# Patient Record
Sex: FEMALE | Race: WHITE | NOT HISPANIC OR LATINO | Employment: FULL TIME | URBAN - METROPOLITAN AREA
[De-identification: names, ages, dates, MRNs, and addresses within clinical notes are randomized per-mention and may not be internally consistent; named-entity substitution may affect disease eponyms.]

---

## 2020-07-27 ENCOUNTER — TELEPHONE (OUTPATIENT)
Dept: OBSTETRICS AND GYNECOLOGY | Facility: CLINIC | Age: 50
End: 2020-07-27

## 2020-07-27 NOTE — TELEPHONE ENCOUNTER
----- Message from Rob Greene sent at 7/24/2020  2:49 PM CDT -----  Regarding: Referral to endocrinology  Type:  Patient Requesting Referral    Who Called:Renetta  Does the patient already have the specialty appointment scheduled?:No  If yes, what is the date of that appointment?:na  Referral to What Specialty:Endocrinology  Reason for Referral:PCOS  Does the patient want the referral with a specific physician?:Dr. Aquino  Is the specialist an [facility]  Patient Requesting a Response?:Yes  Would the patient rather a call back or a response via [facility]? Call back  Best Call Back Number:[phone] (home)  Additional Information: eliza

## 2020-07-28 ENCOUNTER — PATIENT MESSAGE (OUTPATIENT)
Dept: OBSTETRICS AND GYNECOLOGY | Facility: CLINIC | Age: 50
End: 2020-07-28

## 2020-07-30 ENCOUNTER — OFFICE VISIT (OUTPATIENT)
Dept: OBSTETRICS AND GYNECOLOGY | Facility: CLINIC | Age: 50
End: 2020-07-30
Payer: COMMERCIAL

## 2020-07-30 VITALS — WEIGHT: 239 LBS | DIASTOLIC BLOOD PRESSURE: 80 MMHG | SYSTOLIC BLOOD PRESSURE: 118 MMHG

## 2020-07-30 DIAGNOSIS — E28.2 PCOS (POLYCYSTIC OVARIAN SYNDROME): ICD-10-CM

## 2020-07-30 DIAGNOSIS — E88.819 INSULIN RESISTANCE: Primary | ICD-10-CM

## 2020-07-30 PROCEDURE — 99213 OFFICE O/P EST LOW 20 MIN: CPT | Mod: S$GLB,,, | Performed by: OBSTETRICS & GYNECOLOGY

## 2020-07-30 PROCEDURE — 99213 PR OFFICE/OUTPT VISIT, EST, LEVL III, 20-29 MIN: ICD-10-PCS | Mod: S$GLB,,, | Performed by: OBSTETRICS & GYNECOLOGY

## 2020-07-30 RX ORDER — HYDROCHLOROTHIAZIDE 12.5 MG/1
12.5 CAPSULE ORAL DAILY
COMMUNITY
Start: 2020-07-07 | End: 2020-11-23 | Stop reason: SDUPTHER

## 2020-07-30 RX ORDER — METFORMIN HYDROCHLORIDE 850 MG/1
850 TABLET ORAL 2 TIMES DAILY WITH MEALS
COMMUNITY
Start: 2020-07-07 | End: 2020-11-23

## 2020-07-30 RX ORDER — NABUMETONE 500 MG/1
500 TABLET, FILM COATED ORAL DAILY
COMMUNITY
Start: 2020-07-07 | End: 2020-11-23

## 2020-07-30 RX ORDER — SIMVASTATIN 40 MG/1
40 TABLET, FILM COATED ORAL
COMMUNITY
Start: 2020-07-07 | End: 2020-11-23 | Stop reason: SDUPTHER

## 2020-07-30 RX ORDER — ENALAPRIL MALEATE 10 MG/1
10 TABLET ORAL 2 TIMES DAILY
COMMUNITY
Start: 2020-07-07 | End: 2020-11-23 | Stop reason: SDUPTHER

## 2020-07-30 RX ORDER — VALACYCLOVIR HYDROCHLORIDE 500 MG/1
TABLET, FILM COATED ORAL
COMMUNITY
Start: 2020-05-22 | End: 2023-07-05

## 2020-07-30 NOTE — PROGRESS NOTES
Subjective:       Patient ID: Renetta Sotelo is a 49 y.o. female.    Chief Complaint:  Consult (pcos)      History of Present Illness  HPI  Patient presents today with complaints of pain to transfer her care to me for polycystic ovaries and insulin resistance that was previously with Dr. Kristian vanessa who has passed away    GYN & OB History  No LMP recorded.   Date of Last Pap: No result found    OB History    Para Term  AB Living   1 1 1     1   SAB TAB Ectopic Multiple Live Births                  # Outcome Date GA Lbr Willy/2nd Weight Sex Delivery Anes PTL Lv   1 Term      Vag-Spont          Review of Systems  Review of Systems   Constitutional: Negative.  Negative for activity change, appetite change, chills, fatigue, fever and unexpected weight change.   HENT: Negative for nasal congestion.    Eyes: Negative for visual disturbance.   Respiratory: Negative for cough, shortness of breath and wheezing.    Cardiovascular: Negative for chest pain, palpitations and leg swelling.   Gastrointestinal: Negative.  Negative for abdominal pain, bloating, blood in stool, constipation, diarrhea and reflux.   Endocrine: Negative.  Negative for diabetes, hair loss, hot flashes, hyperthyroidism and hypothyroidism.   Genitourinary: Negative.  Negative for bladder incontinence, decreased libido, dysmenorrhea, dyspareunia, dysuria, flank pain, frequency, genital sores, hematuria, hot flashes, menorrhagia, menstrual problem, pelvic pain, urgency, vaginal bleeding, vaginal discharge, vaginal pain, urinary incontinence, postcoital bleeding, postmenopausal bleeding, vaginal dryness and vaginal odor.   Musculoskeletal: Negative for back pain, joint swelling and myalgias.   Integumentary:  Negative for rash, acne, hair changes, mole/lesion, breast mass, nipple discharge, breast skin changes and breast tenderness. Negative.   Neurological: Negative.  Negative for vertigo, seizures, syncope, numbness and headaches.    Hematological: Negative.  Negative for adenopathy. Does not bruise/bleed easily.   Psychiatric/Behavioral: Negative.  Negative for depression and sleep disturbance. The patient is not nervous/anxious.    Breast: negative.  Negative for asymmetry, breast self exam, lump, mass, mastodynia, nipple discharge, skin changes and tenderness          Objective:    Physical Exam:    HENT:   Nose: No epistaxis.                                   Assessment:        1. Insulin resistance    2. PCOS (polycystic ovarian syndrome)       Insulin resistance    PCOS (polycystic ovarian syndrome)             Plan:      Follow up for already scheduled appointment.     she currently has enough metformin I will refill that when it is necessary  Pt was counseled on chronic anovulation/PCOS, including common signs/symptoms and the many factors that influence it.  Pt was also counseled on available treatment options, including the associated risks and benefits of each.  Pt voiced understanding and desires to proceed with metformin Pt was counseled on the link between obesity and PCOS.  Recommend a healthy diet and exercise regimen with goal 20% weight loss.

## 2020-07-31 ENCOUNTER — TELEPHONE (OUTPATIENT)
Dept: OBSTETRICS AND GYNECOLOGY | Facility: CLINIC | Age: 50
End: 2020-07-31

## 2020-07-31 DIAGNOSIS — E66.9 OBESITY, UNSPECIFIED CLASSIFICATION, UNSPECIFIED OBESITY TYPE, UNSPECIFIED WHETHER SERIOUS COMORBIDITY PRESENT: Primary | ICD-10-CM

## 2020-07-31 RX ORDER — PHENTERMINE HYDROCHLORIDE 37.5 MG/1
37.5 CAPSULE ORAL EVERY MORNING
Qty: 30 CAPSULE | Refills: 0 | Status: SHIPPED | OUTPATIENT
Start: 2020-07-31 | End: 2020-08-30

## 2020-07-31 NOTE — TELEPHONE ENCOUNTER
Renetta came in yesterday for a consult and forgot to ask if she could get phentermine filled. Her blood pressure was 117 / 80

## 2020-09-05 ENCOUNTER — OFFICE VISIT (OUTPATIENT)
Dept: URGENT CARE | Facility: CLINIC | Age: 50
End: 2020-09-05
Payer: COMMERCIAL

## 2020-09-05 VITALS — OXYGEN SATURATION: 98 % | TEMPERATURE: 98 F | HEART RATE: 118 BPM

## 2020-09-05 DIAGNOSIS — S93.402A SPRAIN OF LEFT ANKLE, UNSPECIFIED LIGAMENT, INITIAL ENCOUNTER: Primary | ICD-10-CM

## 2020-09-05 DIAGNOSIS — T14.90XA TRAUMA: ICD-10-CM

## 2020-09-05 PROBLEM — E78.00 PURE HYPERCHOLESTEROLEMIA: Status: ACTIVE | Noted: 2020-09-05

## 2020-09-05 PROBLEM — E11.9 TYPE 2 DIABETES MELLITUS, WITHOUT LONG-TERM CURRENT USE OF INSULIN: Status: ACTIVE | Noted: 2020-09-05

## 2020-09-05 PROBLEM — I10 ESSENTIAL HYPERTENSION: Status: ACTIVE | Noted: 2020-09-05

## 2020-09-05 PROCEDURE — 99214 OFFICE O/P EST MOD 30 MIN: CPT | Mod: S$GLB,,, | Performed by: FAMILY MEDICINE

## 2020-09-05 PROCEDURE — 99214 PR OFFICE/OUTPT VISIT, EST, LEVL IV, 30-39 MIN: ICD-10-PCS | Mod: S$GLB,,, | Performed by: FAMILY MEDICINE

## 2020-09-05 PROCEDURE — 73610 XR ANKLE COMPLETE 3 VIEW LEFT: ICD-10-PCS | Mod: FY,LT,S$GLB, | Performed by: RADIOLOGY

## 2020-09-05 PROCEDURE — 73610 X-RAY EXAM OF ANKLE: CPT | Mod: FY,LT,S$GLB, | Performed by: RADIOLOGY

## 2020-09-05 NOTE — PATIENT INSTRUCTIONS

## 2020-09-05 NOTE — PROGRESS NOTES
Subjective:       Patient ID: Renetta Sotelo is a 50 y.o. female.    Vitals:  temperature is 97.7 °F (36.5 °C). Her pulse is 118 (abnormal). Her oxygen saturation is 98%.     Chief Complaint: Ankle Injury    Patient states that she rolled her left ankle about thirty minutes ago.  Patient says that she can walk on her ankle but states that it is painful to do so.  Pt also says that her ankle hurts with any movement.    Ankle Injury   The incident occurred less than 1 hour ago. The incident occurred in the street. The injury mechanism was a twisting injury. The pain is present in the left ankle. The quality of the pain is described as shooting. The pain is at a severity of 5/10. The pain is mild. The pain has been constant since onset. Associated symptoms include an inability to bear weight. She reports no foreign bodies present. The symptoms are aggravated by movement and weight bearing. She has tried nothing for the symptoms.       Constitution: Negative for chills, fatigue and fever.   HENT: Negative for congestion and sore throat.    Neck: Negative for painful lymph nodes.   Cardiovascular: Negative for chest pain and leg swelling.   Eyes: Negative for double vision and blurred vision.   Respiratory: Negative for cough and shortness of breath.    Gastrointestinal: Negative for nausea, vomiting and diarrhea.   Genitourinary: Negative for dysuria, frequency, urgency and history of kidney stones.   Musculoskeletal: Positive for joint pain and joint swelling. Negative for muscle cramps and muscle ache.   Skin: Negative for color change, pale, rash and bruising.   Allergic/Immunologic: Negative for seasonal allergies.   Neurological: Negative for dizziness, history of vertigo, light-headedness, passing out and headaches.   Hematologic/Lymphatic: Negative for swollen lymph nodes.   Psychiatric/Behavioral: Negative for nervous/anxious, sleep disturbance and depression. The patient is not nervous/anxious.         Objective:      Physical Exam   Abdominal: Normal appearance.   Musculoskeletal:         General: Swelling and tenderness present.      Comments: Swelling of lateral malleolus and anterior-lateral joint line.Mild pain with dorsiflexion and more extreme inversion. No significant ecchymosis.   Normal pulses and good capillary refill and sensation in lower foot   Neurological: She is alert.   Skin: Skin is warm and dry.   Nursing note and vitals reviewed.        Assessment:       1. Sprain of left ankle, unspecified ligament, initial encounter    2. Trauma    3. Essential hypertension    4. Pure hypercholesterolemia        Plan:         Sprain of left ankle, unspecified ligament, initial encounter  -     GEL ANKLE SPLINT FOR HOME USE    Trauma  -     XR ANKLE COMPLETE 3 VIEW LEFT; Future; Expected date: 09/05/2020    Essential hypertension    Pure hypercholesterolemia      Patient Instructions       Understanding Ankle Sprain    The ankle is the joint where the leg and foot meet. Bones are held in place by connective tissue called ligaments. When ankle ligaments are stretched to the point of pain and injury, it is called an ankle sprain. A sprain can tear the ligaments. These tears can be very small but still cause pain. Ankle sprains can be mild or severe.  What causes an ankle sprain?  A sprain may occur when you twist your ankle or bend it too far. This can happen when you stumble or fall. Things that can make an ankle sprain more likely include:  · Having had an ankle sprain before  · Playing sports that involve running and jumping. Or playing contact sports such as football or hockey.  · Wearing shoes that dont support your feet and ankles well  · Having ankles with poor strength and flexibility  Symptoms of an ankle sprain  Symptoms may include:  · Pain or soreness in the ankle  · Swelling  · Redness or bruising  · Not being able to walk or put weight on the affected foot  · Reduced range of motion in the  ankle  · A popping or tearing feeling at the time the sprain occurs  · An abnormal or dislocated look to the ankle  · Instability or too much range of motion in the ankle  Treatment for an ankle sprain  Treatment focuses on reducing pain and swelling, and avoiding further injury. Treatments may include:  · Resting the ankle. Avoid putting weight on it. This may mean using crutches until the sprain heals.  · Prescription or over-the-counter pain medicines. These help reduce swelling and pain.  · Cold packs. These help reduce pain and swelling.  · Raising your ankle above your heart. This helps reduce swelling.  · Wrapping the ankle with an elastic bandage or ankle brace. This helps reduce swelling and gives some support to the ankle. In rare cases, you may need a cast or boot.  · Stretching and other exercises. These improve flexibility and strength.  · Heat packs. These may be recommended before doing ankle exercises.  Possible complications of an ankle sprain  An ankle that has been weakened by a sprain can be more likely to have repeated sprains afterward. Doing exercises to strengthen your ankle and improve balance can reduce your risk for repeated sprains. Other possible complications are long-term (chronic) pain or an ankle that remains unstable.  When to call your healthcare provider  Call your healthcare provider right away if you have any of these:  · Fever of 100.4°F (38°C) or higher, or as directed  · Pain, numbness, discoloration, or coldness in the foot or toes  · Pain that gets worse  · Symptoms that dont get better, or get worse  · New symptoms   Date Last Reviewed: 3/10/2016  © 8689-7688 [employer]. [address] [place] [address], [place] [address]. All rights reserved. This information is not intended as a substitute for professional medical care. Always follow your healthcare professional's instructions.

## 2020-09-19 ENCOUNTER — PATIENT MESSAGE (OUTPATIENT)
Dept: OBSTETRICS AND GYNECOLOGY | Facility: CLINIC | Age: 50
End: 2020-09-19

## 2020-10-06 ENCOUNTER — PATIENT MESSAGE (OUTPATIENT)
Dept: OBSTETRICS AND GYNECOLOGY | Facility: CLINIC | Age: 50
End: 2020-10-06

## 2020-10-21 ENCOUNTER — PATIENT MESSAGE (OUTPATIENT)
Dept: OBSTETRICS AND GYNECOLOGY | Facility: CLINIC | Age: 50
End: 2020-10-21

## 2020-11-23 ENCOUNTER — OFFICE VISIT (OUTPATIENT)
Dept: FAMILY MEDICINE | Facility: CLINIC | Age: 50
End: 2020-11-23
Payer: COMMERCIAL

## 2020-11-23 VITALS
HEIGHT: 67 IN | HEART RATE: 94 BPM | DIASTOLIC BLOOD PRESSURE: 88 MMHG | OXYGEN SATURATION: 98 % | TEMPERATURE: 98 F | WEIGHT: 246 LBS | SYSTOLIC BLOOD PRESSURE: 120 MMHG | BODY MASS INDEX: 38.61 KG/M2

## 2020-11-23 DIAGNOSIS — Z00.00 PREVENTATIVE HEALTH CARE: Primary | ICD-10-CM

## 2020-11-23 DIAGNOSIS — G89.29 CHRONIC ANKLE PAIN, UNSPECIFIED LATERALITY: ICD-10-CM

## 2020-11-23 DIAGNOSIS — E28.2 PCOS (POLYCYSTIC OVARIAN SYNDROME): ICD-10-CM

## 2020-11-23 DIAGNOSIS — Z12.11 COLON CANCER SCREENING: ICD-10-CM

## 2020-11-23 DIAGNOSIS — E55.9 VITAMIN D DEFICIENCY: ICD-10-CM

## 2020-11-23 DIAGNOSIS — I10 ESSENTIAL HYPERTENSION: ICD-10-CM

## 2020-11-23 DIAGNOSIS — M25.579 CHRONIC ANKLE PAIN, UNSPECIFIED LATERALITY: ICD-10-CM

## 2020-11-23 DIAGNOSIS — E78.5 HYPERLIPIDEMIA, UNSPECIFIED HYPERLIPIDEMIA TYPE: ICD-10-CM

## 2020-11-23 PROCEDURE — 3008F PR BODY MASS INDEX (BMI) DOCUMENTED: ICD-10-PCS | Mod: CPTII,S$GLB,, | Performed by: FAMILY MEDICINE

## 2020-11-23 PROCEDURE — 99214 OFFICE O/P EST MOD 30 MIN: CPT | Mod: S$GLB,,, | Performed by: FAMILY MEDICINE

## 2020-11-23 PROCEDURE — 3008F BODY MASS INDEX DOCD: CPT | Mod: CPTII,S$GLB,, | Performed by: FAMILY MEDICINE

## 2020-11-23 PROCEDURE — 99214 PR OFFICE/OUTPT VISIT, EST, LEVL IV, 30-39 MIN: ICD-10-PCS | Mod: S$GLB,,, | Performed by: FAMILY MEDICINE

## 2020-11-23 RX ORDER — METFORMIN HYDROCHLORIDE 500 MG/1
500 TABLET, EXTENDED RELEASE ORAL 2 TIMES DAILY WITH MEALS
Qty: 180 TABLET | Refills: 3 | Status: SHIPPED | OUTPATIENT
Start: 2020-11-23 | End: 2021-12-23 | Stop reason: SDUPTHER

## 2020-11-23 RX ORDER — ENALAPRIL MALEATE 10 MG/1
10 TABLET ORAL 2 TIMES DAILY
Qty: 180 TABLET | Refills: 1 | Status: SHIPPED | OUTPATIENT
Start: 2020-11-23 | End: 2021-06-21 | Stop reason: SDUPTHER

## 2020-11-23 RX ORDER — SIMVASTATIN 40 MG/1
40 TABLET, FILM COATED ORAL NIGHTLY
Qty: 90 TABLET | Refills: 1 | Status: SHIPPED | OUTPATIENT
Start: 2020-11-23 | End: 2022-09-20

## 2020-11-23 RX ORDER — HYDROCHLOROTHIAZIDE 12.5 MG/1
12.5 CAPSULE ORAL DAILY
Qty: 90 CAPSULE | Refills: 1 | Status: SHIPPED | OUTPATIENT
Start: 2020-11-23 | End: 2021-06-21 | Stop reason: SDUPTHER

## 2020-11-23 RX ORDER — CHOLECALCIFEROL (VITAMIN D3) 25 MCG
TABLET ORAL DAILY
COMMUNITY
End: 2020-11-23

## 2020-11-23 RX ORDER — MELOXICAM 15 MG/1
15 TABLET ORAL DAILY
Qty: 90 TABLET | Refills: 1 | Status: SHIPPED | OUTPATIENT
Start: 2020-11-23 | End: 2021-02-21

## 2020-11-23 RX ORDER — TOPIRAMATE 50 MG/1
50 TABLET, FILM COATED ORAL 2 TIMES DAILY
Qty: 60 TABLET | Refills: 0 | Status: SHIPPED | OUTPATIENT
Start: 2020-11-23 | End: 2021-07-15

## 2020-11-23 RX ORDER — PHENTERMINE HYDROCHLORIDE 37.5 MG/1
37.5 TABLET ORAL
Qty: 30 TABLET | Refills: 0 | Status: SHIPPED | OUTPATIENT
Start: 2020-11-23 | End: 2020-12-21 | Stop reason: SDUPTHER

## 2020-11-23 RX ORDER — ERGOCALCIFEROL 1.25 MG/1
50000 CAPSULE ORAL
Qty: 12 CAPSULE | Refills: 1 | Status: SHIPPED | OUTPATIENT
Start: 2020-11-23 | End: 2021-02-21

## 2020-11-23 NOTE — PROGRESS NOTES
Subjective:       Patient ID: Renetta Sotelo is a 50 y.o. female.    Chief Complaint: Establish Care    HPI     Here to [facility] care    PCOS - follows with Dr. Snider for this and on metformin. She does have some GI upset with metformin -agrees to switch to ER    HTN - well controlled on current medications    HLD - on statin every other day due to myalgias. This seems to prevent side effects    Chronic foot pain - was on mobic previously and would like to try again. Foot pain daily    Lost large amount of weight on adipex and topamax previously and would like to do this again. No side effects on medication     Prev - needs c-scope. utd mmg.     Review of Systems   Constitutional: Negative for chills, diaphoresis, fatigue and fever.   HENT: Negative for nasal congestion, hearing loss, rhinorrhea, sinus pressure/congestion, sore throat, trouble swallowing and voice change.    Eyes: Negative for pain and visual disturbance.   Respiratory: Negative for cough, chest tightness, shortness of breath and wheezing.    Cardiovascular: Negative for chest pain, palpitations and leg swelling.   Gastrointestinal: Negative for abdominal pain, constipation, diarrhea, nausea and vomiting.   Endocrine: Negative for polydipsia and polyuria.   Genitourinary: Negative for decreased urine volume, dysuria, flank pain, frequency, hematuria and pelvic pain.   Musculoskeletal: Positive for arthralgias. Negative for back pain and myalgias.   Integumentary:  Negative for color change, pallor and rash.   Neurological: Negative for dizziness, syncope, weakness, light-headedness and headaches.   Hematological: Negative for adenopathy.   Psychiatric/Behavioral: Negative for dysphoric mood and sleep disturbance. The patient is not nervous/anxious.          Objective:      Physical Exam  Vitals signs reviewed.   Constitutional:       General: She is not in acute distress.     Appearance: She is well-developed. She is not diaphoretic.   HENT:      Head:  Normocephalic and atraumatic.      Nose: Nose normal.   Eyes:      Conjunctiva/sclera: Conjunctivae normal.   Neck:      Musculoskeletal: Normal range of motion and neck supple.      Thyroid: No thyromegaly.      Vascular: No JVD.   Cardiovascular:      Rate and Rhythm: Normal rate and regular rhythm.      Pulses: Normal pulses.      Heart sounds: Normal heart sounds. No murmur. No friction rub. No gallop.    Pulmonary:      Effort: Pulmonary effort is normal. No respiratory distress.      Breath sounds: Normal breath sounds. No stridor. No wheezing, rhonchi or rales.   Abdominal:      General: Bowel sounds are normal. There is no distension.      Palpations: Abdomen is soft.      Tenderness: There is no abdominal tenderness.   Musculoskeletal:         General: No tenderness.      Right lower leg: No edema.      Left lower leg: No edema.   Lymphadenopathy:      Cervical: No cervical adenopathy.   Skin:     General: Skin is warm and dry.      Coloration: Skin is not pale.      Findings: No erythema or rash.   Neurological:      Mental Status: She is alert and oriented to person, place, and time.   Psychiatric:         Mood and Affect: Mood normal.         Behavior: Behavior normal.         Assessment:       1. Preventative health care    2. Essential hypertension    3. Hyperlipidemia, unspecified hyperlipidemia type    4. PCOS (polycystic ovarian syndrome)    5. Chronic ankle pain, unspecified laterality    6. Vitamin D deficiency    7. Colon cancer screening    8. BMI 38.0-38.9,adult        Plan:       Renetta was seen today for establish care.    Diagnoses and all orders for this visit:    Preventative health care  -     CBC Auto Differential; Future  -     Comprehensive Metabolic Panel; Future  -     TSH; Future  -     Lipid Panel; Future  -     Hemoglobin A1C; Future  -     Urinalysis; Future  -     CBC Auto Differential  -     Comprehensive Metabolic Panel  -     TSH  -     Lipid Panel  -     Hemoglobin A1C  -      Urinalysis    Essential hypertension  Comments:  controlled, continue enalapril and hctz  Orders:  -     enalapril (VASOTEC) 10 MG tablet; Take 1 tablet (10 mg total) by mouth 2 (two) times daily.  -     hydroCHLOROthiazide (MICROZIDE) 12.5 mg capsule; Take 1 capsule (12.5 mg total) by mouth once daily.    Hyperlipidemia, unspecified hyperlipidemia type  Comments:  continue statin on current schedule  Orders:  -     simvastatin (ZOCOR) 40 MG tablet; Take 1 tablet (40 mg total) by mouth every evening. Mon, Wed, Friday due to body aches    PCOS (polycystic ovarian syndrome)  -     metFORMIN (GLUCOPHAGE-XR) 500 MG ER 24hr tablet; Take 1 tablet (500 mg total) by mouth 2 (two) times daily with meals.    Chronic ankle pain, unspecified laterality  -     meloxicam (MOBIC) 15 MG tablet; Take 1 tablet (15 mg total) by mouth once daily.    Vitamin D deficiency  -     ergocalciferol (ERGOCALCIFEROL) 50,000 unit Cap; Take 1 capsule (50,000 Units total) by mouth every 7 days.    Colon cancer screening  -     Ambulatory referral/consult to Gastroenterology; Future    BMI 38.0-38.9,adult  -     phentermine (ADIPEX-P) 37.5 mg tablet; Take 1 tablet (37.5 mg total) by mouth before breakfast.  -     topiramate (TOPAMAX) 50 MG tablet; Take 1 tablet (50 mg total) by mouth 2 (two) times daily.

## 2020-12-01 ENCOUNTER — PATIENT MESSAGE (OUTPATIENT)
Dept: FAMILY MEDICINE | Facility: CLINIC | Age: 50
End: 2020-12-01

## 2020-12-14 ENCOUNTER — PATIENT MESSAGE (OUTPATIENT)
Dept: OBSTETRICS AND GYNECOLOGY | Facility: CLINIC | Age: 50
End: 2020-12-14

## 2020-12-15 ENCOUNTER — PATIENT MESSAGE (OUTPATIENT)
Dept: FAMILY MEDICINE | Facility: CLINIC | Age: 50
End: 2020-12-15

## 2020-12-22 RX ORDER — PHENTERMINE HYDROCHLORIDE 37.5 MG/1
37.5 TABLET ORAL
Qty: 30 TABLET | Refills: 0 | Status: SHIPPED | OUTPATIENT
Start: 2020-12-22 | End: 2021-01-21

## 2020-12-29 ENCOUNTER — OFFICE VISIT (OUTPATIENT)
Dept: OBSTETRICS AND GYNECOLOGY | Facility: CLINIC | Age: 50
End: 2020-12-29
Payer: COMMERCIAL

## 2020-12-29 VITALS
HEIGHT: 67 IN | WEIGHT: 245 LBS | BODY MASS INDEX: 38.45 KG/M2 | SYSTOLIC BLOOD PRESSURE: 120 MMHG | DIASTOLIC BLOOD PRESSURE: 80 MMHG

## 2020-12-29 DIAGNOSIS — Z00.00 ENCOUNTER FOR WELLNESS EXAMINATION: ICD-10-CM

## 2020-12-29 DIAGNOSIS — Z12.31 ENCOUNTER FOR SCREENING MAMMOGRAM FOR MALIGNANT NEOPLASM OF BREAST: ICD-10-CM

## 2020-12-29 DIAGNOSIS — Z12.11 SCREEN FOR COLON CANCER: ICD-10-CM

## 2020-12-29 DIAGNOSIS — N85.2 ENLARGED UTERUS: Primary | ICD-10-CM

## 2020-12-29 PROCEDURE — 3008F PR BODY MASS INDEX (BMI) DOCUMENTED: ICD-10-PCS | Mod: CPTII,S$GLB,, | Performed by: OBSTETRICS & GYNECOLOGY

## 2020-12-29 PROCEDURE — 99396 PREV VISIT EST AGE 40-64: CPT | Mod: S$GLB,,, | Performed by: OBSTETRICS & GYNECOLOGY

## 2020-12-29 PROCEDURE — 99396 PR PREVENTIVE VISIT,EST,40-64: ICD-10-PCS | Mod: S$GLB,,, | Performed by: OBSTETRICS & GYNECOLOGY

## 2020-12-29 PROCEDURE — 3008F BODY MASS INDEX DOCD: CPT | Mod: CPTII,S$GLB,, | Performed by: OBSTETRICS & GYNECOLOGY

## 2020-12-29 NOTE — PROGRESS NOTES
Subjective:       Patient ID: Renetta Sotelo is a 50 y.o. female.    Chief Complaint:  Well Woman      History of Present Illness  HPI  Annual Exam-Premenopausal  Patient presents for annual exam. The patient has no complaints today. The patient is sexually active. GYN screening history: last pap: was normal.       GYN & OB History  Patient's last menstrual period was 2020 (exact date).   Date of Last Pap: No result found    OB History    Para Term  AB Living   1 1 1     1   SAB TAB Ectopic Multiple Live Births                  # Outcome Date GA Lbr Willy/2nd Weight Sex Delivery Anes PTL Lv   1 Term      Vag-Spont          Review of Systems  Review of Systems   Constitutional: Negative.  Negative for activity change, appetite change, chills, fatigue, fever and unexpected weight change.   HENT: Negative for nasal congestion.    Eyes: Negative for visual disturbance.   Respiratory: Negative for cough, shortness of breath and wheezing.    Cardiovascular: Negative for chest pain, palpitations and leg swelling.   Gastrointestinal: Negative.  Negative for abdominal pain, bloating, blood in stool, constipation, diarrhea and reflux.   Endocrine: Negative.  Negative for diabetes, hair loss, hot flashes, hyperthyroidism and hypothyroidism.   Genitourinary: Negative.  Negative for bladder incontinence, decreased libido, dysmenorrhea, dyspareunia, dysuria, flank pain, frequency, genital sores, hematuria, hot flashes, menorrhagia, menstrual problem, pelvic pain, urgency, vaginal bleeding, vaginal discharge, vaginal pain, urinary incontinence, postcoital bleeding, postmenopausal bleeding, vaginal dryness and vaginal odor.   Musculoskeletal: Negative for back pain, joint swelling and myalgias.   Integumentary:  Negative for rash, acne, hair changes, mole/lesion, breast mass, nipple discharge, breast skin changes and breast tenderness. Negative.   Neurological: Negative.  Negative for vertigo, seizures, syncope,  numbness and headaches.   Hematological: Negative.  Negative for adenopathy. Does not bruise/bleed easily.   Psychiatric/Behavioral: Negative.  Negative for depression and sleep disturbance. The patient is not nervous/anxious.    Breast: negative.  Negative for asymmetry, breast self exam, lump, mass, mastodynia, nipple discharge, skin changes and tenderness          Objective:    Physical Exam:   Constitutional: She appears well-developed and well-nourished.    HENT:   Nose: No epistaxis.     Neck: No thyroid mass and no thyromegaly present.    Cardiovascular: Normal rate, regular rhythm and normal pulses.     Pulmonary/Chest: Effort normal and breath sounds normal. Chest wall is not dull to percussion. She exhibits no mass, no tenderness, no bony tenderness, no laceration, no crepitus, no edema, no deformity, no swelling and no retraction. Right breast exhibits no inverted nipple, no mass, no nipple discharge, no skin change, no tenderness, presence, no bleeding and no swelling. Left breast exhibits no inverted nipple, no mass, no nipple discharge, no skin change, no tenderness, presence, no bleeding and no swelling.        Abdominal: Soft. Normal appearance and bowel sounds are normal. There is no abdominal tenderness. Hernia confirmed negative in the right inguinal area and confirmed negative in the left inguinal area.     Genitourinary:    Vagina normal.      Pelvic exam was performed with patient supine.   There is no rash, tenderness, lesion or injury on the right labia. There is no rash, tenderness, lesion or injury on the left labia. Uterus is enlarged. Cervix is normal. Right adnexum displays no mass, no tenderness and no fullness. Left adnexum displays no mass, no tenderness and no fullness. No rectocele, cystocele or unspecified prolapse of vaginal walls in the vagina. Labial bartholins normal.               Skin: Skin is warm and dry.    Psychiatric: She has a normal mood and affect. Her speech is  normal and behavior is normal.          Assessment:        1. Enlarged uterus    2. Encounter for wellness examination    3. Encounter for screening mammogram for malignant neoplasm of breast       Enlarged uterus  -     US OB/GYN Procedure ([facility]); Future    Encounter for wellness examination    Encounter for screening mammogram for malignant neoplasm of breast  -     Mammo Digital Screening Bilat w/ Azeem; Future; Expected date: 12/29/2020             Plan:      Follow up in about 1 year (around 12/29/2021).

## 2020-12-30 LAB
ABS NRBC COUNT: 0 X 10 3/UL (ref 0–0.01)
ABSOLUTE BASOPHIL: 0.05 X 10 3/UL (ref 0–0.22)
ABSOLUTE EOSINOPHIL: 0.17 X 10 3/UL (ref 0.04–0.54)
ABSOLUTE IMMATURE GRAN: 0.05 X 10 3/UL (ref 0–0.04)
ABSOLUTE LYMPHOCYTE: 2.32 X 10 3/UL (ref 0.86–4.75)
ABSOLUTE MONOCYTE: 0.56 X 10 3/UL (ref 0.22–1.08)
ALBUMIN SERPL-MCNC: 4.1 G/DL (ref 3.5–5.2)
ALBUMIN/GLOB SERPL ELPH: 1.8 {RATIO} (ref 1–2.7)
ALP ISOS SERPL LEV INH-CCNC: 55 U/L (ref 35–105)
ALT (SGPT): 32 U/L (ref 0–33)
AMORPH URATE CRY URNS QL MICRO: NEGATIVE
ANION GAP SERPL CALC-SCNC: 9 MMOL/L (ref 8–17)
AST SERPL-CCNC: 21 U/L (ref 0–32)
BACTERIA #/AREA URNS HPF: ABNORMAL /[HPF]
BASOPHILS NFR BLD: 0.6 % (ref 0.2–1.2)
BILIRUB UR QL STRIP: NEGATIVE
BILIRUBIN, TOTAL: 0.29 MG/DL (ref 0–1.2)
BUN/CREAT SERPL: 17.5 (ref 6–20)
CALCIUM SERPL-MCNC: 8.7 MG/DL (ref 8.6–10.2)
CARBON DIOXIDE, CO2: 26 MMOL/L (ref 22–29)
CHLORIDE: 103 MMOL/L (ref 98–107)
CHOLEST SERPL-MSCNC: 164 MG/DL (ref 100–200)
CLARITY UR: ABNORMAL
COLOR UR: YELLOW
CREAT SERPL-MCNC: 0.8 MG/DL (ref 0.5–0.9)
EOSINOPHIL NFR BLD: 2.2 % (ref 0.7–7)
EPITHELIAL CELLS: ABNORMAL
ESTIMATED AVERAGE GLUCOSE: 113 MG/DL
GFR ESTIMATION: 75.62
GLOBULIN: 2.3 G/DL (ref 1.5–4.5)
GLUCOSE (UA): NEGATIVE MG/DL
GLUCOSE: 91 MG/DL (ref 74–106)
HBA1C MFR BLD: 5.6 % (ref 4–6)
HCT VFR BLD AUTO: 43.8 % (ref 37–47)
HDLC SERPL-MCNC: 49 MG/DL
HGB BLD-MCNC: 14 G/DL (ref 12–16)
IMMATURE GRANULOCYTES: 0.6 % (ref 0–0.5)
KETONES UR QL STRIP: NEGATIVE MG/DL
LDL/HDL RATIO: 2 (ref 1–3)
LDLC SERPL CALC-MCNC: 96.2 MG/DL (ref 0–100)
LEUKOCYTE ESTERASE UR QL STRIP: NEGATIVE
LYMPHOCYTES NFR BLD: 29.6 % (ref 19.3–53.1)
MCH RBC QN AUTO: 30.6 PG (ref 27–32)
MCHC RBC AUTO-ENTMCNC: 32 G/DL (ref 32–36)
MCV RBC AUTO: 95.6 FL (ref 82–100)
MONOCYTES NFR BLD: 7.2 % (ref 4.7–12.5)
MUCOUS THREADS URNS QL MICRO: ABNORMAL
NEUTROPHILS # BLD AUTO: 4.68 X 10 3/UL (ref 2.15–7.56)
NEUTROPHILS NFR BLD: 59.8 %
NITRITE UR QL STRIP: NEGATIVE
NUCLEATED RED BLOOD CELLS: 0 /100 WBC (ref 0–0.2)
OCCULT BLOOD: ABNORMAL
PH, URINE: 6 (ref 5–7.5)
PLATELET # BLD AUTO: 364 X 10 3/UL (ref 135–400)
POTASSIUM: 4.1 MMOL/L (ref 3.5–5.1)
PROT SNV-MCNC: 6.4 G/DL (ref 6.4–8.3)
PROT UR QL STRIP: NEGATIVE MG/DL
RBC # BLD AUTO: 4.58 X 10 6/UL (ref 4.2–5.4)
RBC/HPF: ABNORMAL
RDW-SD: 46.5 FL (ref 37–54)
SODIUM: 138 MMOL/L (ref 136–145)
SP GR UR STRIP: 1.02 (ref 1–1.03)
TRIGL SERPL-MCNC: 94 MG/DL (ref 0–150)
TSH SERPL DL<=0.005 MIU/L-ACNC: 2.23 UIU/ML (ref 0.27–4.2)
UREA NITROGEN (BUN): 14 MG/DL (ref 6–20)
UROBILINOGEN, URINE: NORMAL E.U./DL (ref 0–1)
WBC # BLD: 7.83 X 10 3/UL (ref 4.3–10.8)
WBC/HPF: NEGATIVE

## 2020-12-31 ENCOUNTER — PROCEDURE VISIT (OUTPATIENT)
Dept: OBSTETRICS AND GYNECOLOGY | Facility: CLINIC | Age: 50
End: 2020-12-31
Payer: COMMERCIAL

## 2020-12-31 ENCOUNTER — PATIENT MESSAGE (OUTPATIENT)
Dept: OBSTETRICS AND GYNECOLOGY | Facility: CLINIC | Age: 50
End: 2020-12-31

## 2020-12-31 DIAGNOSIS — N85.2 ENLARGED UTERUS: ICD-10-CM

## 2020-12-31 PROCEDURE — 76830 PR  ECHOGRAPHY,TRANSVAGINAL: ICD-10-PCS | Mod: 26,S$GLB,, | Performed by: OBSTETRICS & GYNECOLOGY

## 2020-12-31 PROCEDURE — 76830 TRANSVAGINAL US NON-OB: CPT | Mod: 26,S$GLB,, | Performed by: OBSTETRICS & GYNECOLOGY

## 2021-01-15 ENCOUNTER — PATIENT MESSAGE (OUTPATIENT)
Dept: FAMILY MEDICINE | Facility: CLINIC | Age: 51
End: 2021-01-15

## 2021-01-18 ENCOUNTER — PATIENT MESSAGE (OUTPATIENT)
Dept: OBSTETRICS AND GYNECOLOGY | Facility: CLINIC | Age: 51
End: 2021-01-18

## 2021-01-21 ENCOUNTER — OFFICE VISIT (OUTPATIENT)
Dept: FAMILY MEDICINE | Facility: CLINIC | Age: 51
End: 2021-01-21
Payer: COMMERCIAL

## 2021-01-21 VITALS
BODY MASS INDEX: 39.08 KG/M2 | DIASTOLIC BLOOD PRESSURE: 76 MMHG | HEART RATE: 88 BPM | OXYGEN SATURATION: 98 % | TEMPERATURE: 98 F | SYSTOLIC BLOOD PRESSURE: 134 MMHG | WEIGHT: 249 LBS | HEIGHT: 67 IN

## 2021-01-21 DIAGNOSIS — R00.0 TACHYCARDIA: Primary | ICD-10-CM

## 2021-01-21 PROCEDURE — 93000 PR ELECTROCARDIOGRAM, COMPLETE: ICD-10-PCS | Mod: S$GLB,,, | Performed by: FAMILY MEDICINE

## 2021-01-21 PROCEDURE — 3008F PR BODY MASS INDEX (BMI) DOCUMENTED: ICD-10-PCS | Mod: CPTII,S$GLB,, | Performed by: FAMILY MEDICINE

## 2021-01-21 PROCEDURE — 3078F DIAST BP <80 MM HG: CPT | Mod: CPTII,S$GLB,, | Performed by: FAMILY MEDICINE

## 2021-01-21 PROCEDURE — 93000 ELECTROCARDIOGRAM COMPLETE: CPT | Mod: S$GLB,,, | Performed by: FAMILY MEDICINE

## 2021-01-21 PROCEDURE — 99213 OFFICE O/P EST LOW 20 MIN: CPT | Mod: 25,S$GLB,, | Performed by: FAMILY MEDICINE

## 2021-01-21 PROCEDURE — 3078F PR MOST RECENT DIASTOLIC BLOOD PRESSURE < 80 MM HG: ICD-10-PCS | Mod: CPTII,S$GLB,, | Performed by: FAMILY MEDICINE

## 2021-01-21 PROCEDURE — 99213 PR OFFICE/OUTPT VISIT, EST, LEVL III, 20-29 MIN: ICD-10-PCS | Mod: 25,S$GLB,, | Performed by: FAMILY MEDICINE

## 2021-01-21 PROCEDURE — 3075F PR MOST RECENT SYSTOLIC BLOOD PRESS GE 130-139MM HG: ICD-10-PCS | Mod: CPTII,S$GLB,, | Performed by: FAMILY MEDICINE

## 2021-01-21 PROCEDURE — 3008F BODY MASS INDEX DOCD: CPT | Mod: CPTII,S$GLB,, | Performed by: FAMILY MEDICINE

## 2021-01-21 PROCEDURE — 3075F SYST BP GE 130 - 139MM HG: CPT | Mod: CPTII,S$GLB,, | Performed by: FAMILY MEDICINE

## 2021-02-09 ENCOUNTER — PATIENT OUTREACH (OUTPATIENT)
Dept: ADMINISTRATIVE | Facility: HOSPITAL | Age: 51
End: 2021-02-09

## 2021-03-15 ENCOUNTER — PATIENT OUTREACH (OUTPATIENT)
Dept: ADMINISTRATIVE | Facility: HOSPITAL | Age: 51
End: 2021-03-15

## 2021-03-21 ENCOUNTER — PATIENT MESSAGE (OUTPATIENT)
Dept: FAMILY MEDICINE | Facility: CLINIC | Age: 51
End: 2021-03-21

## 2021-03-22 RX ORDER — PHENTERMINE HYDROCHLORIDE 37.5 MG/1
37.5 TABLET ORAL
Qty: 30 TABLET | Refills: 0 | Status: SHIPPED | OUTPATIENT
Start: 2021-03-22 | End: 2021-04-21

## 2021-04-30 ENCOUNTER — PATIENT MESSAGE (OUTPATIENT)
Dept: FAMILY MEDICINE | Facility: CLINIC | Age: 51
End: 2021-04-30

## 2021-04-30 RX ORDER — PHENTERMINE HYDROCHLORIDE 37.5 MG/1
37.5 TABLET ORAL
Qty: 30 TABLET | Refills: 0 | OUTPATIENT
Start: 2021-04-30 | End: 2021-05-30

## 2021-05-16 ENCOUNTER — PATIENT MESSAGE (OUTPATIENT)
Dept: OBSTETRICS AND GYNECOLOGY | Facility: CLINIC | Age: 51
End: 2021-05-16

## 2021-06-08 ENCOUNTER — PATIENT MESSAGE (OUTPATIENT)
Dept: FAMILY MEDICINE | Facility: CLINIC | Age: 51
End: 2021-06-08

## 2021-06-21 ENCOUNTER — PATIENT MESSAGE (OUTPATIENT)
Dept: FAMILY MEDICINE | Facility: CLINIC | Age: 51
End: 2021-06-21

## 2021-06-21 DIAGNOSIS — I10 ESSENTIAL HYPERTENSION: ICD-10-CM

## 2021-06-21 RX ORDER — HYDROCHLOROTHIAZIDE 12.5 MG/1
12.5 CAPSULE ORAL DAILY
Qty: 90 CAPSULE | Refills: 1 | Status: SHIPPED | OUTPATIENT
Start: 2021-06-21 | End: 2021-08-20

## 2021-06-21 RX ORDER — ENALAPRIL MALEATE 10 MG/1
10 TABLET ORAL 2 TIMES DAILY
Qty: 180 TABLET | Refills: 1 | Status: SHIPPED | OUTPATIENT
Start: 2021-06-21 | End: 2021-08-20

## 2021-07-06 ENCOUNTER — PATIENT MESSAGE (OUTPATIENT)
Dept: FAMILY MEDICINE | Facility: CLINIC | Age: 51
End: 2021-07-06

## 2021-07-13 ENCOUNTER — PATIENT MESSAGE (OUTPATIENT)
Dept: FAMILY MEDICINE | Facility: CLINIC | Age: 51
End: 2021-07-13

## 2021-07-14 DIAGNOSIS — Z00.00 PREVENTATIVE HEALTH CARE: Primary | ICD-10-CM

## 2021-07-15 ENCOUNTER — LAB VISIT (OUTPATIENT)
Dept: LAB | Facility: OTHER | Age: 51
End: 2021-07-15
Attending: FAMILY MEDICINE
Payer: COMMERCIAL

## 2021-07-15 DIAGNOSIS — Z00.00 PREVENTATIVE HEALTH CARE: Primary | ICD-10-CM

## 2021-07-15 LAB
ALBUMIN SERPL BCP-MCNC: 3.7 G/DL (ref 3.5–5.2)
ALP SERPL-CCNC: 60 U/L (ref 55–135)
ALT SERPL W/O P-5'-P-CCNC: 61 U/L (ref 10–44)
ANION GAP SERPL CALC-SCNC: 12 MMOL/L (ref 8–16)
AST SERPL-CCNC: 39 U/L (ref 10–40)
BACTERIA #/AREA URNS HPF: ABNORMAL /HPF
BASOPHILS # BLD AUTO: 0.06 K/UL (ref 0–0.2)
BASOPHILS NFR BLD: 0.6 % (ref 0–1.9)
BILIRUB SERPL-MCNC: 0.5 MG/DL (ref 0.1–1)
BILIRUB UR QL STRIP: NEGATIVE
BUN SERPL-MCNC: 12 MG/DL (ref 6–20)
CALCIUM SERPL-MCNC: 8.5 MG/DL (ref 8.7–10.5)
CHLORIDE SERPL-SCNC: 106 MMOL/L (ref 95–110)
CLARITY UR: ABNORMAL
CO2 SERPL-SCNC: 19 MMOL/L (ref 23–29)
COLOR UR: YELLOW
CREAT SERPL-MCNC: 0.7 MG/DL (ref 0.5–1.4)
DIFFERENTIAL METHOD: ABNORMAL
EOSINOPHIL # BLD AUTO: 0.2 K/UL (ref 0–0.5)
EOSINOPHIL NFR BLD: 1.8 % (ref 0–8)
ERYTHROCYTE [DISTWIDTH] IN BLOOD BY AUTOMATED COUNT: 13.1 % (ref 11.5–14.5)
EST. GFR  (AFRICAN AMERICAN): >60 ML/MIN/1.73 M^2
EST. GFR  (NON AFRICAN AMERICAN): >60 ML/MIN/1.73 M^2
ESTIMATED AVG GLUCOSE: 105 MG/DL (ref 68–131)
GLUCOSE SERPL-MCNC: 73 MG/DL (ref 70–110)
GLUCOSE UR QL STRIP: NEGATIVE
HBA1C MFR BLD: 5.3 % (ref 4–5.6)
HCT VFR BLD AUTO: 44.6 % (ref 37–48.5)
HGB BLD-MCNC: 14.6 G/DL (ref 12–16)
HGB UR QL STRIP: ABNORMAL
IMM GRANULOCYTES # BLD AUTO: 0.05 K/UL (ref 0–0.04)
IMM GRANULOCYTES NFR BLD AUTO: 0.5 % (ref 0–0.5)
KETONES UR QL STRIP: NEGATIVE
LEUKOCYTE ESTERASE UR QL STRIP: NEGATIVE
LYMPHOCYTES # BLD AUTO: 2.2 K/UL (ref 1–4.8)
LYMPHOCYTES NFR BLD: 22.9 % (ref 18–48)
MCH RBC QN AUTO: 30.9 PG (ref 27–31)
MCHC RBC AUTO-ENTMCNC: 32.7 G/DL (ref 32–36)
MCV RBC AUTO: 94 FL (ref 82–98)
MICROSCOPIC COMMENT: ABNORMAL
MONOCYTES # BLD AUTO: 0.6 K/UL (ref 0.3–1)
MONOCYTES NFR BLD: 6 % (ref 4–15)
NEUTROPHILS # BLD AUTO: 6.4 K/UL (ref 1.8–7.7)
NEUTROPHILS NFR BLD: 68.2 % (ref 38–73)
NITRITE UR QL STRIP: NEGATIVE
NRBC BLD-RTO: 0 /100 WBC
PH UR STRIP: 6 [PH] (ref 5–8)
PLATELET # BLD AUTO: 317 K/UL (ref 150–450)
PMV BLD AUTO: 11 FL (ref 9.2–12.9)
POTASSIUM SERPL-SCNC: 4 MMOL/L (ref 3.5–5.1)
PROT SERPL-MCNC: 6.6 G/DL (ref 6–8.4)
PROT UR QL STRIP: NEGATIVE
RBC # BLD AUTO: 4.73 M/UL (ref 4–5.4)
RBC #/AREA URNS HPF: 3 /HPF (ref 0–4)
SODIUM SERPL-SCNC: 137 MMOL/L (ref 136–145)
SP GR UR STRIP: >=1.03 (ref 1–1.03)
SQUAMOUS #/AREA URNS HPF: 4 /HPF
TSH SERPL DL<=0.005 MIU/L-ACNC: 1.78 UIU/ML (ref 0.4–4)
URN SPEC COLLECT METH UR: ABNORMAL
UROBILINOGEN UR STRIP-ACNC: NEGATIVE EU/DL
WBC # BLD AUTO: 9.44 K/UL (ref 3.9–12.7)
YEAST URNS QL MICRO: ABNORMAL

## 2021-07-15 PROCEDURE — 84443 ASSAY THYROID STIM HORMONE: CPT | Performed by: FAMILY MEDICINE

## 2021-07-15 PROCEDURE — 85025 COMPLETE CBC W/AUTO DIFF WBC: CPT | Performed by: FAMILY MEDICINE

## 2021-07-15 PROCEDURE — 83036 HEMOGLOBIN GLYCOSYLATED A1C: CPT | Performed by: FAMILY MEDICINE

## 2021-07-15 PROCEDURE — 81000 URINALYSIS NONAUTO W/SCOPE: CPT | Performed by: FAMILY MEDICINE

## 2021-07-15 PROCEDURE — 80053 COMPREHEN METABOLIC PANEL: CPT | Performed by: FAMILY MEDICINE

## 2021-07-15 PROCEDURE — 80061 LIPID PANEL: CPT | Performed by: FAMILY MEDICINE

## 2021-07-15 PROCEDURE — 36415 COLL VENOUS BLD VENIPUNCTURE: CPT | Performed by: FAMILY MEDICINE

## 2021-07-16 LAB
CHOLEST SERPL-MCNC: 151 MG/DL (ref 120–199)
CHOLEST/HDLC SERPL: 3.5 {RATIO} (ref 2–5)
HDLC SERPL-MCNC: 43 MG/DL (ref 40–75)
HDLC SERPL: 28.5 % (ref 20–50)
LDLC SERPL CALC-MCNC: 83.4 MG/DL (ref 63–159)
NONHDLC SERPL-MCNC: 108 MG/DL
TRIGL SERPL-MCNC: 123 MG/DL (ref 30–150)

## 2021-07-21 ENCOUNTER — PATIENT MESSAGE (OUTPATIENT)
Dept: FAMILY MEDICINE | Facility: CLINIC | Age: 51
End: 2021-07-21

## 2021-07-21 ENCOUNTER — OFFICE VISIT (OUTPATIENT)
Dept: FAMILY MEDICINE | Facility: CLINIC | Age: 51
End: 2021-07-21
Payer: COMMERCIAL

## 2021-07-21 VITALS
SYSTOLIC BLOOD PRESSURE: 137 MMHG | HEART RATE: 88 BPM | OXYGEN SATURATION: 97 % | WEIGHT: 267.31 LBS | HEIGHT: 67 IN | DIASTOLIC BLOOD PRESSURE: 77 MMHG | BODY MASS INDEX: 41.96 KG/M2

## 2021-07-21 DIAGNOSIS — I10 ESSENTIAL HYPERTENSION: Primary | ICD-10-CM

## 2021-07-21 DIAGNOSIS — E88.819 INSULIN RESISTANCE: ICD-10-CM

## 2021-07-21 DIAGNOSIS — B37.31 CANDIDA VAGINITIS: ICD-10-CM

## 2021-07-21 DIAGNOSIS — E78.5 HYPERLIPIDEMIA, UNSPECIFIED HYPERLIPIDEMIA TYPE: ICD-10-CM

## 2021-07-21 PROCEDURE — 99214 PR OFFICE/OUTPT VISIT, EST, LEVL IV, 30-39 MIN: ICD-10-PCS | Mod: S$GLB,,, | Performed by: FAMILY MEDICINE

## 2021-07-21 PROCEDURE — 3078F DIAST BP <80 MM HG: CPT | Mod: CPTII,S$GLB,, | Performed by: FAMILY MEDICINE

## 2021-07-21 PROCEDURE — 99214 OFFICE O/P EST MOD 30 MIN: CPT | Mod: S$GLB,,, | Performed by: FAMILY MEDICINE

## 2021-07-21 PROCEDURE — 3008F BODY MASS INDEX DOCD: CPT | Mod: CPTII,S$GLB,, | Performed by: FAMILY MEDICINE

## 2021-07-21 PROCEDURE — 3078F PR MOST RECENT DIASTOLIC BLOOD PRESSURE < 80 MM HG: ICD-10-PCS | Mod: CPTII,S$GLB,, | Performed by: FAMILY MEDICINE

## 2021-07-21 PROCEDURE — 3075F PR MOST RECENT SYSTOLIC BLOOD PRESS GE 130-139MM HG: ICD-10-PCS | Mod: CPTII,S$GLB,, | Performed by: FAMILY MEDICINE

## 2021-07-21 PROCEDURE — 3075F SYST BP GE 130 - 139MM HG: CPT | Mod: CPTII,S$GLB,, | Performed by: FAMILY MEDICINE

## 2021-07-21 PROCEDURE — 3008F PR BODY MASS INDEX (BMI) DOCUMENTED: ICD-10-PCS | Mod: CPTII,S$GLB,, | Performed by: FAMILY MEDICINE

## 2021-07-21 RX ORDER — FLUCONAZOLE 150 MG/1
150 TABLET ORAL DAILY
Qty: 1 TABLET | Refills: 0 | Status: SHIPPED | OUTPATIENT
Start: 2021-07-21 | End: 2021-07-22

## 2021-07-26 ENCOUNTER — TELEPHONE (OUTPATIENT)
Dept: FAMILY MEDICINE | Facility: CLINIC | Age: 51
End: 2021-07-26

## 2021-09-06 ENCOUNTER — PATIENT MESSAGE (OUTPATIENT)
Dept: FAMILY MEDICINE | Facility: CLINIC | Age: 51
End: 2021-09-06

## 2021-09-13 RX ORDER — BUPROPION HYDROCHLORIDE 150 MG/1
150 TABLET ORAL DAILY
Qty: 30 TABLET | Refills: 3 | Status: SHIPPED | OUTPATIENT
Start: 2021-09-13 | End: 2022-01-18

## 2021-10-13 DIAGNOSIS — I10 ESSENTIAL HYPERTENSION: ICD-10-CM

## 2021-10-14 RX ORDER — HYDROCHLOROTHIAZIDE 12.5 MG/1
12.5 CAPSULE ORAL DAILY
Qty: 90 CAPSULE | Refills: 0 | Status: SHIPPED | OUTPATIENT
Start: 2021-10-14 | End: 2021-12-23 | Stop reason: SDUPTHER

## 2021-10-14 RX ORDER — ENALAPRIL MALEATE 10 MG/1
10 TABLET ORAL 2 TIMES DAILY
Qty: 180 TABLET | Refills: 0 | Status: SHIPPED | OUTPATIENT
Start: 2021-10-14 | End: 2021-12-23 | Stop reason: SDUPTHER

## 2021-11-01 ENCOUNTER — OFFICE VISIT (OUTPATIENT)
Dept: FAMILY MEDICINE | Facility: CLINIC | Age: 51
End: 2021-11-01
Payer: COMMERCIAL

## 2021-11-01 VITALS
HEIGHT: 67 IN | BODY MASS INDEX: 40.97 KG/M2 | SYSTOLIC BLOOD PRESSURE: 141 MMHG | OXYGEN SATURATION: 97 % | DIASTOLIC BLOOD PRESSURE: 85 MMHG | HEART RATE: 87 BPM | WEIGHT: 261 LBS

## 2021-11-01 DIAGNOSIS — E28.2 POLYCYSTIC DISEASE, OVARIES: ICD-10-CM

## 2021-11-01 DIAGNOSIS — S46.011A ROTATOR CUFF STRAIN, RIGHT, INITIAL ENCOUNTER: Primary | ICD-10-CM

## 2021-11-01 PROCEDURE — 96372 PR INJECTION,THERAP/PROPH/DIAG2ST, IM OR SUBCUT: ICD-10-PCS | Mod: S$GLB,,, | Performed by: PHYSICIAN ASSISTANT

## 2021-11-01 PROCEDURE — 4010F ACE/ARB THERAPY RXD/TAKEN: CPT | Mod: CPTII,S$GLB,, | Performed by: PHYSICIAN ASSISTANT

## 2021-11-01 PROCEDURE — 1159F PR MEDICATION LIST DOCUMENTED IN MEDICAL RECORD: ICD-10-PCS | Mod: CPTII,S$GLB,, | Performed by: PHYSICIAN ASSISTANT

## 2021-11-01 PROCEDURE — 3044F PR MOST RECENT HEMOGLOBIN A1C LEVEL <7.0%: ICD-10-PCS | Mod: CPTII,S$GLB,, | Performed by: PHYSICIAN ASSISTANT

## 2021-11-01 PROCEDURE — 1159F MED LIST DOCD IN RCRD: CPT | Mod: CPTII,S$GLB,, | Performed by: PHYSICIAN ASSISTANT

## 2021-11-01 PROCEDURE — 3008F PR BODY MASS INDEX (BMI) DOCUMENTED: ICD-10-PCS | Mod: CPTII,S$GLB,, | Performed by: PHYSICIAN ASSISTANT

## 2021-11-01 PROCEDURE — 3008F BODY MASS INDEX DOCD: CPT | Mod: CPTII,S$GLB,, | Performed by: PHYSICIAN ASSISTANT

## 2021-11-01 PROCEDURE — 1160F PR REVIEW ALL MEDS BY PRESCRIBER/CLIN PHARMACIST DOCUMENTED: ICD-10-PCS | Mod: CPTII,S$GLB,, | Performed by: PHYSICIAN ASSISTANT

## 2021-11-01 PROCEDURE — 3077F PR MOST RECENT SYSTOLIC BLOOD PRESSURE >= 140 MM HG: ICD-10-PCS | Mod: CPTII,S$GLB,, | Performed by: PHYSICIAN ASSISTANT

## 2021-11-01 PROCEDURE — 99213 OFFICE O/P EST LOW 20 MIN: CPT | Mod: 25,S$GLB,, | Performed by: PHYSICIAN ASSISTANT

## 2021-11-01 PROCEDURE — 3079F PR MOST RECENT DIASTOLIC BLOOD PRESSURE 80-89 MM HG: ICD-10-PCS | Mod: CPTII,S$GLB,, | Performed by: PHYSICIAN ASSISTANT

## 2021-11-01 PROCEDURE — 3079F DIAST BP 80-89 MM HG: CPT | Mod: CPTII,S$GLB,, | Performed by: PHYSICIAN ASSISTANT

## 2021-11-01 PROCEDURE — 99213 PR OFFICE/OUTPT VISIT, EST, LEVL III, 20-29 MIN: ICD-10-PCS | Mod: 25,S$GLB,, | Performed by: PHYSICIAN ASSISTANT

## 2021-11-01 PROCEDURE — 96372 THER/PROPH/DIAG INJ SC/IM: CPT | Mod: S$GLB,,, | Performed by: PHYSICIAN ASSISTANT

## 2021-11-01 PROCEDURE — 3077F SYST BP >= 140 MM HG: CPT | Mod: CPTII,S$GLB,, | Performed by: PHYSICIAN ASSISTANT

## 2021-11-01 PROCEDURE — 4010F PR ACE/ARB THEARPY RXD/TAKEN: ICD-10-PCS | Mod: CPTII,S$GLB,, | Performed by: PHYSICIAN ASSISTANT

## 2021-11-01 PROCEDURE — 3044F HG A1C LEVEL LT 7.0%: CPT | Mod: CPTII,S$GLB,, | Performed by: PHYSICIAN ASSISTANT

## 2021-11-01 PROCEDURE — 1160F RVW MEDS BY RX/DR IN RCRD: CPT | Mod: CPTII,S$GLB,, | Performed by: PHYSICIAN ASSISTANT

## 2021-11-01 RX ORDER — CYCLOBENZAPRINE HCL 10 MG
10 TABLET ORAL EVERY 12 HOURS
Qty: 20 TABLET | Refills: 0 | Status: SHIPPED | OUTPATIENT
Start: 2021-11-01 | End: 2021-11-11

## 2021-11-01 RX ORDER — BETAMETHASONE SODIUM PHOSPHATE AND BETAMETHASONE ACETATE 3; 3 MG/ML; MG/ML
6 INJECTION, SUSPENSION INTRA-ARTICULAR; INTRALESIONAL; INTRAMUSCULAR; SOFT TISSUE ONCE
Status: COMPLETED | OUTPATIENT
Start: 2021-11-01 | End: 2021-11-01

## 2021-11-01 RX ORDER — KETOROLAC TROMETHAMINE 30 MG/ML
15 INJECTION, SOLUTION INTRAMUSCULAR; INTRAVENOUS
Status: COMPLETED | OUTPATIENT
Start: 2021-11-01 | End: 2021-11-01

## 2021-11-01 RX ORDER — KETOROLAC TROMETHAMINE 30 MG/ML
15 INJECTION, SOLUTION INTRAMUSCULAR; INTRAVENOUS
Status: DISCONTINUED | OUTPATIENT
Start: 2021-11-01 | End: 2021-11-01

## 2021-11-01 RX ADMIN — KETOROLAC TROMETHAMINE 15 MG: 30 INJECTION, SOLUTION INTRAMUSCULAR; INTRAVENOUS at 03:11

## 2021-11-01 RX ADMIN — BETAMETHASONE SODIUM PHOSPHATE AND BETAMETHASONE ACETATE 6 MG: 3; 3 INJECTION, SUSPENSION INTRA-ARTICULAR; INTRALESIONAL; INTRAMUSCULAR; SOFT TISSUE at 02:11

## 2021-12-03 ENCOUNTER — PATIENT MESSAGE (OUTPATIENT)
Dept: FAMILY MEDICINE | Facility: CLINIC | Age: 51
End: 2021-12-03
Payer: COMMERCIAL

## 2021-12-04 ENCOUNTER — PATIENT MESSAGE (OUTPATIENT)
Dept: FAMILY MEDICINE | Facility: CLINIC | Age: 51
End: 2021-12-04
Payer: COMMERCIAL

## 2021-12-23 DIAGNOSIS — I10 ESSENTIAL HYPERTENSION: ICD-10-CM

## 2021-12-23 DIAGNOSIS — E28.2 PCOS (POLYCYSTIC OVARIAN SYNDROME): ICD-10-CM

## 2021-12-27 RX ORDER — METFORMIN HYDROCHLORIDE 500 MG/1
500 TABLET, EXTENDED RELEASE ORAL 2 TIMES DAILY WITH MEALS
Qty: 180 TABLET | Refills: 3 | Status: SHIPPED | OUTPATIENT
Start: 2021-12-27 | End: 2024-01-17

## 2021-12-27 RX ORDER — HYDROCHLOROTHIAZIDE 12.5 MG/1
12.5 CAPSULE ORAL DAILY
Qty: 90 CAPSULE | Refills: 0 | Status: SHIPPED | OUTPATIENT
Start: 2021-12-27 | End: 2023-02-27

## 2021-12-27 RX ORDER — ENALAPRIL MALEATE 10 MG/1
10 TABLET ORAL 2 TIMES DAILY
Qty: 180 TABLET | Refills: 0 | Status: SHIPPED | OUTPATIENT
Start: 2021-12-27

## 2022-01-13 ENCOUNTER — OFFICE VISIT (OUTPATIENT)
Dept: FAMILY MEDICINE | Facility: CLINIC | Age: 52
End: 2022-01-13
Payer: COMMERCIAL

## 2022-01-13 ENCOUNTER — PATIENT MESSAGE (OUTPATIENT)
Dept: FAMILY MEDICINE | Facility: CLINIC | Age: 52
End: 2022-01-13

## 2022-01-13 DIAGNOSIS — U07.1 COVID: Primary | ICD-10-CM

## 2022-01-13 PROCEDURE — 99213 PR OFFICE/OUTPT VISIT, EST, LEVL III, 20-29 MIN: ICD-10-PCS | Mod: 95,,, | Performed by: PHYSICIAN ASSISTANT

## 2022-01-13 PROCEDURE — 1159F MED LIST DOCD IN RCRD: CPT | Mod: CPTII,95,, | Performed by: PHYSICIAN ASSISTANT

## 2022-01-13 PROCEDURE — 99213 OFFICE O/P EST LOW 20 MIN: CPT | Mod: 95,,, | Performed by: PHYSICIAN ASSISTANT

## 2022-01-13 PROCEDURE — 1160F RVW MEDS BY RX/DR IN RCRD: CPT | Mod: CPTII,95,, | Performed by: PHYSICIAN ASSISTANT

## 2022-01-13 PROCEDURE — 1159F PR MEDICATION LIST DOCUMENTED IN MEDICAL RECORD: ICD-10-PCS | Mod: CPTII,95,, | Performed by: PHYSICIAN ASSISTANT

## 2022-01-13 PROCEDURE — 1160F PR REVIEW ALL MEDS BY PRESCRIBER/CLIN PHARMACIST DOCUMENTED: ICD-10-PCS | Mod: CPTII,95,, | Performed by: PHYSICIAN ASSISTANT

## 2022-01-13 RX ORDER — PROMETHAZINE HYDROCHLORIDE AND DEXTROMETHORPHAN HYDROBROMIDE 6.25; 15 MG/5ML; MG/5ML
5 SYRUP ORAL EVERY 6 HOURS PRN
Qty: 120 ML | Refills: 0 | Status: SHIPPED | OUTPATIENT
Start: 2022-01-13 | End: 2022-01-20

## 2022-01-13 NOTE — PROGRESS NOTES
"Subjective:   The patient location is: [place], [facility]     The chief complaint leading to consultation is: covid pos    Visit type: audiovisual    Face to Face time with patient: 15min  20 minutes of total time spent on the encounter, which includes face to face time and non-face to face time preparing to see the patient (eg, review of tests), Obtaining and/or reviewing separately obtained history, Documenting clinical information in the electronic or other health record, Independently interpreting results (not separately reported) and communicating results to the patient/family/caregiver, or Care coordination (not separately reported).         Each patient to whom he or she provides medical services by telemedicine is:  (1) informed of the relationship between the physician and patient and the respective role of any other health care provider with respect to management of the patient; and (2) notified that he or she may decline to receive medical services by telemedicine and may withdraw from such care at any time.    Notes:         Patient ID: Renetta Sotelo is a 51 y.o. female.    Chief Complaint: No chief complaint on file.      Virtual visit:  Reports cough and cold s/s x2 weeks.  Felt better and then returned worse 4 days ago.  States she tested pos for covid yesterday.      Taking dayquil and nyquil with good s/s relief.  She is coughing some at night.  "just feels like I have a bad cold"    Cough  This is a new problem. The current episode started in the past 7 days. The problem has been unchanged. The problem occurs hourly. The cough is non-productive. Associated symptoms include nasal congestion and postnasal drip. Pertinent negatives include no chest pain, chills, ear congestion, ear pain, fever, headaches, heartburn, hemoptysis, myalgias, rash, rhinorrhea, sore throat, shortness of breath, sweats, weight loss or wheezing. Nothing aggravates the symptoms. She has tried OTC cough suppressant for the " symptoms. The treatment provided mild relief. There is no history of asthma, bronchiectasis, bronchitis, COPD, emphysema, environmental allergies or pneumonia.       Review of Systems   Constitutional: Negative for chills, fever and weight loss.   HENT: Positive for postnasal drip. Negative for ear pain, rhinorrhea and sore throat.    Respiratory: Positive for cough. Negative for hemoptysis, shortness of breath and wheezing.    Cardiovascular: Negative for chest pain.   Gastrointestinal: Negative for abdominal pain, heartburn, nausea and vomiting.   Musculoskeletal: Negative for myalgias.   Integumentary:  Negative for rash.   Allergic/Immunologic: Negative for environmental allergies.   Neurological: Negative for headaches.       Objective:   There were no vitals filed for this visit.     Physical Exam  HENT:      Head: Normocephalic and atraumatic.      Nose: Nose normal.   Eyes:      Extraocular Movements: Extraocular movements intact.   Pulmonary:      Effort: Pulmonary effort is normal.   Neurological:      Mental Status: She is alert.   Psychiatric:         Mood and Affect: Mood normal.         Thought Content: Thought content normal.         Judgment: Judgment normal.            Assessment & Plan:     COVID  -     promethazine-dextromethorphan (PROMETHAZINE-DM) 6.25-15 mg/5 mL Syrp; Take 5 mLs by mouth every 6 (six) hours as needed (cough).  Dispense: 120 mL; Refill: 0     Pt given at home care instructions see below:  Covid:  -most people with covid can be safely treated from home by treating their symptoms and monitoring themselves at home  -keep hydrated and rest when you feel tired, it can take up to 2 weeks to feel completely like yourself again  -loss of taste and smell can last longer then 2 weeks  -you can take over the counter cough and cold medications if approved by your provider, let us know if these are not working for you    It is important to watch for these important symptoms:  -any shortness  of breath and you should go to the nearest emergency room    Pulse oximetry:  -buy a pulse oximeter at a pharmacy  -check your reading twice a day or if you feel short or breath or very fatigued  -once you place the oximeter on your finger, take a few deep breaths to let it rest on your finger for 30seconds to 1 min to get an accurate readings  -if your reading is less the 94% let us know, less then 90% and you should go to the emergency room.            No follow-ups on file.

## 2022-01-18 ENCOUNTER — PATIENT MESSAGE (OUTPATIENT)
Dept: FAMILY MEDICINE | Facility: CLINIC | Age: 52
End: 2022-01-18
Payer: COMMERCIAL

## 2022-01-27 ENCOUNTER — PATIENT MESSAGE (OUTPATIENT)
Dept: ADMINISTRATIVE | Facility: HOSPITAL | Age: 52
End: 2022-01-27
Payer: COMMERCIAL

## 2022-02-04 ENCOUNTER — OFFICE VISIT (OUTPATIENT)
Dept: OBSTETRICS AND GYNECOLOGY | Facility: CLINIC | Age: 52
End: 2022-02-04
Payer: COMMERCIAL

## 2022-02-04 VITALS
HEART RATE: 102 BPM | DIASTOLIC BLOOD PRESSURE: 83 MMHG | BODY MASS INDEX: 40.34 KG/M2 | WEIGHT: 257 LBS | HEIGHT: 67 IN | SYSTOLIC BLOOD PRESSURE: 134 MMHG

## 2022-02-04 DIAGNOSIS — Z12.31 ENCOUNTER FOR SCREENING MAMMOGRAM FOR MALIGNANT NEOPLASM OF BREAST: ICD-10-CM

## 2022-02-04 DIAGNOSIS — Z00.00 ENCOUNTER FOR WELLNESS EXAMINATION: Primary | ICD-10-CM

## 2022-02-04 PROCEDURE — 3075F SYST BP GE 130 - 139MM HG: CPT | Mod: CPTII,S$GLB,, | Performed by: OBSTETRICS & GYNECOLOGY

## 2022-02-04 PROCEDURE — 3008F PR BODY MASS INDEX (BMI) DOCUMENTED: ICD-10-PCS | Mod: CPTII,S$GLB,, | Performed by: OBSTETRICS & GYNECOLOGY

## 2022-02-04 PROCEDURE — 4010F ACE/ARB THERAPY RXD/TAKEN: CPT | Mod: CPTII,S$GLB,, | Performed by: OBSTETRICS & GYNECOLOGY

## 2022-02-04 PROCEDURE — 3079F DIAST BP 80-89 MM HG: CPT | Mod: CPTII,S$GLB,, | Performed by: OBSTETRICS & GYNECOLOGY

## 2022-02-04 PROCEDURE — 1159F PR MEDICATION LIST DOCUMENTED IN MEDICAL RECORD: ICD-10-PCS | Mod: CPTII,S$GLB,, | Performed by: OBSTETRICS & GYNECOLOGY

## 2022-02-04 PROCEDURE — 3075F PR MOST RECENT SYSTOLIC BLOOD PRESS GE 130-139MM HG: ICD-10-PCS | Mod: CPTII,S$GLB,, | Performed by: OBSTETRICS & GYNECOLOGY

## 2022-02-04 PROCEDURE — 3079F PR MOST RECENT DIASTOLIC BLOOD PRESSURE 80-89 MM HG: ICD-10-PCS | Mod: CPTII,S$GLB,, | Performed by: OBSTETRICS & GYNECOLOGY

## 2022-02-04 PROCEDURE — 4010F PR ACE/ARB THEARPY RXD/TAKEN: ICD-10-PCS | Mod: CPTII,S$GLB,, | Performed by: OBSTETRICS & GYNECOLOGY

## 2022-02-04 PROCEDURE — 99396 PREV VISIT EST AGE 40-64: CPT | Mod: S$GLB,,, | Performed by: OBSTETRICS & GYNECOLOGY

## 2022-02-04 PROCEDURE — 3008F BODY MASS INDEX DOCD: CPT | Mod: CPTII,S$GLB,, | Performed by: OBSTETRICS & GYNECOLOGY

## 2022-02-04 PROCEDURE — 1159F MED LIST DOCD IN RCRD: CPT | Mod: CPTII,S$GLB,, | Performed by: OBSTETRICS & GYNECOLOGY

## 2022-02-04 PROCEDURE — 99396 PR PREVENTIVE VISIT,EST,40-64: ICD-10-PCS | Mod: S$GLB,,, | Performed by: OBSTETRICS & GYNECOLOGY

## 2022-02-04 NOTE — PROGRESS NOTES
Subjective:       Patient ID: Renetta Sotelo is a 51 y.o. female.    Chief Complaint:  Well Woman      History of Present Illness  HPI  Annual Exam-Postmenopausal  Patient presents for annual exam. The patient has no complaints today. The patient is sexually active. GYN screening history: last pap: was normal. The patient is taking hormone replacement therapy. Patient denies post-menopausal vaginal bleeding.   GYN & OB History  No LMP recorded. Patient has had an ablation.   Date of Last Pap: No result found    OB History    Para Term  AB Living   1 1 1     1   SAB IAB Ectopic Multiple Live Births                  # Outcome Date GA Lbr Willy/2nd Weight Sex Delivery Anes PTL Lv   1 Term      Vag-Spont          Review of Systems  Review of Systems   Constitutional: Negative.  Negative for activity change, appetite change, chills, fatigue, fever and unexpected weight change.   HENT: Negative for nasal congestion.    Eyes: Negative for visual disturbance.   Respiratory: Negative for cough, shortness of breath and wheezing.    Cardiovascular: Negative for chest pain, palpitations and leg swelling.   Gastrointestinal: Negative.  Negative for abdominal pain, bloating, blood in stool, constipation, diarrhea and reflux.   Endocrine: Negative.  Negative for diabetes, hair loss, hot flashes, hyperthyroidism and hypothyroidism.   Genitourinary: Negative.  Negative for bladder incontinence, decreased libido, dysmenorrhea, dyspareunia, dysuria, flank pain, frequency, genital sores, hematuria, hot flashes, menorrhagia, menstrual problem, pelvic pain, urgency, vaginal bleeding, vaginal discharge, vaginal pain, urinary incontinence, postcoital bleeding, postmenopausal bleeding, vaginal dryness and vaginal odor.   Musculoskeletal: Negative for back pain, joint swelling and myalgias.   Integumentary:  Negative for rash, acne, hair changes, mole/lesion, breast mass, nipple discharge, breast skin changes and breast  tenderness. Negative.   Neurological: Negative.  Negative for vertigo, seizures, syncope, numbness and headaches.   Hematological: Negative.  Negative for adenopathy. Does not bruise/bleed easily.   Psychiatric/Behavioral: Negative.  Negative for depression and sleep disturbance. The patient is not nervous/anxious.    Breast: negative.  Negative for asymmetry, breast self exam, lump, mass, mastodynia, nipple discharge, skin changes and tenderness          Objective:    Physical Exam:   Constitutional: She appears well-developed and well-nourished.    HENT:   Nose: No epistaxis.     Neck: No thyroid mass and no thyromegaly present.    Cardiovascular: Normal rate, regular rhythm and normal pulses.     Pulmonary/Chest: Effort normal and breath sounds normal. Chest wall is not dull to percussion. She exhibits no mass, no tenderness, no bony tenderness, no laceration, no crepitus, no edema, no deformity, no swelling and no retraction. Right breast exhibits no inverted nipple, no mass, no nipple discharge, no skin change, no tenderness, presence, no bleeding and no swelling. Left breast exhibits no inverted nipple, no mass, no nipple discharge, no skin change, no tenderness, presence, no bleeding and no swelling.        Abdominal: Soft. Normal appearance and bowel sounds are normal. There is no abdominal tenderness. Hernia confirmed negative in the right inguinal area and confirmed negative in the left inguinal area.     Genitourinary:    Vagina and uterus normal.      Pelvic exam was performed with patient supine.   Labial bartholins normal.There is no rash, tenderness, lesion or injury on the right labia. There is no rash, tenderness, lesion or injury on the left labia. Cervix is normal. Right adnexum displays no mass, no tenderness and no fullness. Left adnexum displays no mass, no tenderness and no fullness. No rectocele, cystocele or unspecified prolapse of vaginal walls in the vagina.              Lymphadenopathy:         Right: No inguinal adenopathy present.        Left: No inguinal adenopathy present.     Skin: Skin is warm, dry and intact.    Psychiatric: She has a normal mood and affect. Her speech is normal and behavior is normal. Cognition and memory are normal.          Assessment:        1. Encounter for wellness examination    2. Encounter for screening mammogram for malignant neoplasm of breast       Encounter for wellness examination    Encounter for screening mammogram for malignant neoplasm of breast  -     Mammo Digital Screening Bilat w/ [facility]; Future; Expected date: 02/04/2022               Plan:      Follow up in about 1 year (around 2/4/2023).

## 2022-03-03 ENCOUNTER — PATIENT MESSAGE (OUTPATIENT)
Dept: OBSTETRICS AND GYNECOLOGY | Facility: CLINIC | Age: 52
End: 2022-03-03
Payer: COMMERCIAL

## 2022-09-14 ENCOUNTER — PATIENT MESSAGE (OUTPATIENT)
Dept: OBSTETRICS AND GYNECOLOGY | Facility: CLINIC | Age: 52
End: 2022-09-14
Payer: COMMERCIAL

## 2022-09-15 ENCOUNTER — TELEPHONE (OUTPATIENT)
Dept: OBSTETRICS AND GYNECOLOGY | Facility: CLINIC | Age: 52
End: 2022-09-15
Payer: COMMERCIAL

## 2022-09-15 NOTE — TELEPHONE ENCOUNTER
----- Message from Michael Van sent at 9/15/2022  8:50 AM CDT -----  Contact: Patient  Type:  Patient Returning Call      Who Left Message for Patient:   Debbie      LUKAS#      [phone]

## 2022-09-20 ENCOUNTER — OFFICE VISIT (OUTPATIENT)
Dept: OBSTETRICS AND GYNECOLOGY | Facility: CLINIC | Age: 52
End: 2022-09-20
Payer: COMMERCIAL

## 2022-09-20 ENCOUNTER — PROCEDURE VISIT (OUTPATIENT)
Dept: OBSTETRICS AND GYNECOLOGY | Facility: CLINIC | Age: 52
End: 2022-09-20
Payer: COMMERCIAL

## 2022-09-20 VITALS
SYSTOLIC BLOOD PRESSURE: 121 MMHG | WEIGHT: 234.19 LBS | HEART RATE: 92 BPM | HEIGHT: 67 IN | DIASTOLIC BLOOD PRESSURE: 80 MMHG | BODY MASS INDEX: 36.76 KG/M2

## 2022-09-20 DIAGNOSIS — N92.0 MENORRHAGIA WITH REGULAR CYCLE: Primary | ICD-10-CM

## 2022-09-20 DIAGNOSIS — N92.0 MENORRHAGIA WITH REGULAR CYCLE: ICD-10-CM

## 2022-09-20 DIAGNOSIS — D21.9 FIBROIDS: ICD-10-CM

## 2022-09-20 PROCEDURE — 4010F ACE/ARB THERAPY RXD/TAKEN: CPT | Mod: CPTII,S$GLB,, | Performed by: OBSTETRICS & GYNECOLOGY

## 2022-09-20 PROCEDURE — 1159F PR MEDICATION LIST DOCUMENTED IN MEDICAL RECORD: ICD-10-PCS | Mod: CPTII,S$GLB,, | Performed by: OBSTETRICS & GYNECOLOGY

## 2022-09-20 PROCEDURE — 3008F BODY MASS INDEX DOCD: CPT | Mod: CPTII,S$GLB,, | Performed by: OBSTETRICS & GYNECOLOGY

## 2022-09-20 PROCEDURE — 3074F SYST BP LT 130 MM HG: CPT | Mod: CPTII,S$GLB,, | Performed by: OBSTETRICS & GYNECOLOGY

## 2022-09-20 PROCEDURE — 1160F PR REVIEW ALL MEDS BY PRESCRIBER/CLIN PHARMACIST DOCUMENTED: ICD-10-PCS | Mod: CPTII,S$GLB,, | Performed by: OBSTETRICS & GYNECOLOGY

## 2022-09-20 PROCEDURE — 3079F PR MOST RECENT DIASTOLIC BLOOD PRESSURE 80-89 MM HG: ICD-10-PCS | Mod: CPTII,S$GLB,, | Performed by: OBSTETRICS & GYNECOLOGY

## 2022-09-20 PROCEDURE — 1159F MED LIST DOCD IN RCRD: CPT | Mod: CPTII,S$GLB,, | Performed by: OBSTETRICS & GYNECOLOGY

## 2022-09-20 PROCEDURE — 76856 US EXAM PELVIC COMPLETE: CPT | Mod: S$GLB,,, | Performed by: OBSTETRICS & GYNECOLOGY

## 2022-09-20 PROCEDURE — 99214 PR OFFICE/OUTPT VISIT, EST, LEVL IV, 30-39 MIN: ICD-10-PCS | Mod: 25,S$GLB,, | Performed by: OBSTETRICS & GYNECOLOGY

## 2022-09-20 PROCEDURE — 3008F PR BODY MASS INDEX (BMI) DOCUMENTED: ICD-10-PCS | Mod: CPTII,S$GLB,, | Performed by: OBSTETRICS & GYNECOLOGY

## 2022-09-20 PROCEDURE — 1160F RVW MEDS BY RX/DR IN RCRD: CPT | Mod: CPTII,S$GLB,, | Performed by: OBSTETRICS & GYNECOLOGY

## 2022-09-20 PROCEDURE — 76856 US OB/GYN PROCEDURE (VIEWPOINT): ICD-10-PCS | Mod: S$GLB,,, | Performed by: OBSTETRICS & GYNECOLOGY

## 2022-09-20 PROCEDURE — 3074F PR MOST RECENT SYSTOLIC BLOOD PRESSURE < 130 MM HG: ICD-10-PCS | Mod: CPTII,S$GLB,, | Performed by: OBSTETRICS & GYNECOLOGY

## 2022-09-20 PROCEDURE — 99214 OFFICE O/P EST MOD 30 MIN: CPT | Mod: 25,S$GLB,, | Performed by: OBSTETRICS & GYNECOLOGY

## 2022-09-20 PROCEDURE — 4010F PR ACE/ARB THEARPY RXD/TAKEN: ICD-10-PCS | Mod: CPTII,S$GLB,, | Performed by: OBSTETRICS & GYNECOLOGY

## 2022-09-20 PROCEDURE — 3079F DIAST BP 80-89 MM HG: CPT | Mod: CPTII,S$GLB,, | Performed by: OBSTETRICS & GYNECOLOGY

## 2022-09-20 RX ORDER — SEMAGLUTIDE 1.34 MG/ML
1 INJECTION, SOLUTION SUBCUTANEOUS
COMMUNITY
Start: 2022-08-25 | End: 2023-07-05

## 2022-09-20 RX ORDER — ROSUVASTATIN CALCIUM 10 MG/1
10 TABLET, COATED ORAL NIGHTLY
COMMUNITY
Start: 2022-09-13

## 2022-09-20 RX ORDER — BUPROPION HYDROCHLORIDE 300 MG/1
300 TABLET ORAL DAILY
COMMUNITY

## 2022-09-20 NOTE — PROGRESS NOTES
NEW PATIENT-    PROBLEM VISIT - - NEW PT  Patient Care Team:  Ofelia Palomo MD as PCP - General (Family Medicine)  Denise Bailey MD (Gastroenterology)    HPI:  Pt is a 52 y.o.  who would like to discuss hysterectomy. She had a failed ablation that never improved periods several years ago.  Not sure what year but prior to . Also would like hyst as she doesn't like having the Essure coils in her tubes now that they are taken off the market. Had discussed hyst at some point with her prior dr who has since moved. 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 Periods always regular  but now heavier with about 4 heavy days where she used to only have 1 heavy day. Discussed all options as well as prior finding of fibroids and would like a tlh/bs with possible oophoprectomy        REVIEW OF PRIOR DATA/ HEALTH MAINTENANCE:    LAST ANNUAL/ PAP:   2022 / 2019 pap and hpv neg  LAST LABS- recently with PCP-will request as she thinks lfts elevated   LAST MMG (screening)- 2022- normal at [facility]    LAST COLONOSCOPY- - by Dr. Bailey  -q 5 yrs for polyps    Past Medical History:   Diagnosis Date    Amenorrhea     Fibroids     Hypercholesteremia     Hypertension     PCOS (polycystic ovarian syndrome)        SURGICAL HX:   has a past surgical history that includes Anterior cruciate ligament repair; Endometrial ablation; Tubal ligation; and Knee arthroscopy.    Family, obstetric, and social history reviewed and updated    ALLERGIES: Patient has no known allergies.    Current Outpatient Medications:     buPROPion (WELLBUTRIN XL) 300 MG 24 hr tablet, Take 300 mg by mouth once daily., Disp: , Rfl:     enalapril (VASOTEC) 10 MG tablet, Take 1 tablet (10 mg total) by mouth 2 (two) times daily., Disp: 180 tablet, Rfl: 0    hydroCHLOROthiazide (MICROZIDE) 12.5 mg capsule, Take 1 capsule (12.5 mg total) by mouth once daily., Disp: 90 capsule, Rfl: 0    metFORMIN  "(GLUCOPHAGE-XR) 500 MG ER 24hr tablet, Take 1 tablet (500 mg total) by mouth 2 (two) times daily with meals., Disp: 180 tablet, Rfl: 3    OZEMPIC 1 mg/dose (4 mg/3 mL), Inject 1 mg into the skin every 7 days., Disp: , Rfl:     rosuvastatin (CRESTOR) 10 MG tablet, Take 10 mg by mouth every evening., Disp: , Rfl:     valACYclovir (VALTREX) 500 MG tablet, As needed, Disp: , Rfl:     ROS:  CONST:  No fever, chills, fatigue or unexpected changes in weight  CV: No chest pain or palpitations  RESP:  No shortness of breath or cough  GI: No abd pain, vomiting, diarrhea, blood in stool, or changes in bowel mvmts  SKIN: No rashes or lesions  MUSCULOSKELETAL: No joint swelling or pain  PSYCH: No changes in mood or insomia  BREASTS: No asymmetry, lumps, pain, nipple discharge, or skin changes  :  No dysuria, urgency, frequency, hematuria or incontinence          No vag dc, itching, odor or dryness          No pelvic pain, dyspareunia, or abnormal vaginal bleeding    VITALS:  Blood pressure 121/80, pulse 92, height 5' 7" (1.702 m), weight 106.2 kg (234 lb 3.2 oz), last menstrual period 09/05/2022.  Body mass index is 36.68 kg/m².    PHYSICAL EXAM-  APPEARANCE: Well appearing, in no acute distress.  NECK: Neck symmetric without masses or thyromegaly.  CV:  Normal rate  PULM: Normal resp rate, no resp distress, normal resp effort  PSYCH: Normal mood and affect, cooperative  SKIN: No rashes, lesions, or abnormal bruising  ABD: Soft, nondistended  MUSC/ SKEL:  normal gait, moves all extremites  BREASTS: deferred/ declined  PELVIC:  deferred/ declined    ASSESSMENT/ PLAN:  Menorrhagia with regular cycle  -     US OB/GYN Procedure ([facility]); Future; Expected date: 09/20/2022    Fibroids    Will request labs from [place] if pt unable to get them- once labs back, and us reviewed will schedule hyst  Do pap at preop    FOLLOWUP:  WWE or sooner prn        "

## 2022-09-21 ENCOUNTER — PATIENT MESSAGE (OUTPATIENT)
Dept: OBSTETRICS AND GYNECOLOGY | Facility: CLINIC | Age: 52
End: 2022-09-21
Payer: COMMERCIAL

## 2022-09-26 ENCOUNTER — PATIENT MESSAGE (OUTPATIENT)
Dept: OBSTETRICS AND GYNECOLOGY | Facility: CLINIC | Age: 52
End: 2022-09-26
Payer: COMMERCIAL

## 2022-09-26 NOTE — PROGRESS NOTES
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 We no longer have any way of morcellating it within the abdomen so once we put the scope in we will have to look at how it is positioned and see if we think it will fit out vaginally. So the reason I want to tell her that is just so she can schedule herself at a time when she will have enough time to recover if we have to go that route. She would likely want more than a week if we ended up doing it abdominally- more like at least ericka break or into the summer, but that is up to her

## 2022-10-03 ENCOUNTER — PATIENT MESSAGE (OUTPATIENT)
Dept: OBSTETRICS AND GYNECOLOGY | Facility: CLINIC | Age: 52
End: 2022-10-03
Payer: COMMERCIAL

## 2022-10-05 DIAGNOSIS — N92.0 MENORRHAGIA WITH REGULAR CYCLE: Primary | ICD-10-CM

## 2022-10-05 DIAGNOSIS — D21.9 FIBROIDS: ICD-10-CM

## 2022-11-08 ENCOUNTER — OFFICE VISIT (OUTPATIENT)
Dept: OBSTETRICS AND GYNECOLOGY | Facility: CLINIC | Age: 52
End: 2022-11-08
Payer: COMMERCIAL

## 2022-11-08 VITALS
SYSTOLIC BLOOD PRESSURE: 130 MMHG | WEIGHT: 239.19 LBS | BODY MASS INDEX: 37.46 KG/M2 | DIASTOLIC BLOOD PRESSURE: 86 MMHG

## 2022-11-08 DIAGNOSIS — Z01.419 ENCOUNTER FOR WELL WOMAN EXAM WITH ROUTINE GYNECOLOGICAL EXAM: Primary | ICD-10-CM

## 2022-11-08 DIAGNOSIS — R11.0 NAUSEA: ICD-10-CM

## 2022-11-08 DIAGNOSIS — G89.18 POST-OP PAIN: ICD-10-CM

## 2022-11-08 DIAGNOSIS — D21.9 FIBROIDS: ICD-10-CM

## 2022-11-08 DIAGNOSIS — Z01.818 PREOPERATIVE EXAM FOR GYNECOLOGIC SURGERY: ICD-10-CM

## 2022-11-08 DIAGNOSIS — N92.4 EXCESSIVE BLEEDING IN PREMENOPAUSAL PERIOD: ICD-10-CM

## 2022-11-08 LAB
ALBUMIN SERPL BCP-MCNC: 3.7 G/DL (ref 3.4–5)
ALBUMIN/GLOBULIN RATIO: 1.32 RATIO (ref 1.1–1.8)
ALP SERPL-CCNC: 75 U/L (ref 46–116)
ALT SERPL W P-5'-P-CCNC: 70 U/L (ref 12–78)
ANION GAP SERPL CALC-SCNC: 8 MMOL/L (ref 3–11)
APPEARANCE, UA: CLEAR
AST SERPL-CCNC: 35 U/L (ref 15–37)
B-HCG UR QL: NEGATIVE
BASOPHILS NFR BLD: 0.7 % (ref 0–3)
BILIRUB SERPL-MCNC: 0.3 MG/DL (ref 0–1)
BILIRUB UR QL STRIP: NEGATIVE MG/DL
BUN SERPL-MCNC: 13 MG/DL (ref 7–18)
BUN/CREAT SERPL: 15.66 RATIO (ref 7–18)
CALCIUM SERPL-MCNC: 8.7 MG/DL (ref 8.8–10.5)
CHLORIDE SERPL-SCNC: 103 MMOL/L (ref 100–108)
CO2 SERPL-SCNC: 26 MMOL/L (ref 21–32)
COLOR UR: NORMAL
CREAT SERPL-MCNC: 0.83 MG/DL (ref 0.55–1.02)
EOSINOPHIL NFR BLD: 2 % (ref 1–3)
ERYTHROCYTE [DISTWIDTH] IN BLOOD BY AUTOMATED COUNT: 13.1 % (ref 12.5–18)
GFR ESTIMATION: > 60
GLOBULIN: 2.8 G/DL (ref 2.3–3.5)
GLUCOSE (UA): NORMAL MG/DL
GLUCOSE SERPL-MCNC: 75 MG/DL (ref 70–110)
HBA1C MFR BLD: 5.6 % (ref 4.5–6.2)
HCT VFR BLD AUTO: 40.9 % (ref 37–47)
HGB BLD-MCNC: 13.8 G/DL (ref 12–16)
HGB UR QL STRIP: NEGATIVE /UL
KETONES UR QL STRIP: NEGATIVE MG/DL
LEUKOCYTE ESTERASE UR QL STRIP: NEGATIVE /UL
LYMPHOCYTES NFR BLD: 26.9 % (ref 25–40)
MCH RBC QN AUTO: 30.9 PG (ref 27–31.2)
MCHC RBC AUTO-ENTMCNC: 33.7 G/DL (ref 31.8–35.4)
MCV RBC AUTO: 91.7 FL (ref 80–97)
MONOCYTES NFR BLD: 6.3 % (ref 1–15)
NEUTROPHILS # BLD AUTO: 6.11 10*3/UL (ref 1.8–7.7)
NEUTROPHILS NFR BLD: 63.3 % (ref 37–80)
NITRITE UR QL STRIP: NEGATIVE
NUCLEATED RED BLOOD CELLS: 0 %
PH UR STRIP: 5 PH (ref 5–9)
PLATELETS: 349 10*3/UL (ref 142–424)
POTASSIUM SERPL-SCNC: 4.4 MMOL/L (ref 3.6–5.2)
PROT SERPL-MCNC: 6.5 G/DL (ref 6.4–8.2)
PROT UR QL STRIP: NEGATIVE MG/DL
RBC # BLD AUTO: 4.46 10*6/UL (ref 4.2–5.4)
RPR: NON REACTIVE
SODIUM BLD-SCNC: 137 MMOL/L (ref 135–145)
SP GR UR STRIP: 1.02 (ref 1–1.03)
SPECIMEN COLLECTION METHOD, URINE: NORMAL
TSH SERPL DL<=0.005 MIU/L-ACNC: 2.01 UIU/ML (ref 0.36–3.74)
UROBILINOGEN UR STRIP-ACNC: NORMAL MG/DL
WBC # BLD: 9.7 10*3/UL (ref 4.6–10.2)

## 2022-11-08 PROCEDURE — 3079F DIAST BP 80-89 MM HG: CPT | Mod: CPTII,S$GLB,, | Performed by: OBSTETRICS & GYNECOLOGY

## 2022-11-08 PROCEDURE — 4010F PR ACE/ARB THEARPY RXD/TAKEN: ICD-10-PCS | Mod: CPTII,S$GLB,, | Performed by: OBSTETRICS & GYNECOLOGY

## 2022-11-08 PROCEDURE — 99214 OFFICE O/P EST MOD 30 MIN: CPT | Mod: S$GLB,,, | Performed by: OBSTETRICS & GYNECOLOGY

## 2022-11-08 PROCEDURE — 3075F PR MOST RECENT SYSTOLIC BLOOD PRESS GE 130-139MM HG: ICD-10-PCS | Mod: CPTII,S$GLB,, | Performed by: OBSTETRICS & GYNECOLOGY

## 2022-11-08 PROCEDURE — 3008F BODY MASS INDEX DOCD: CPT | Mod: CPTII,S$GLB,, | Performed by: OBSTETRICS & GYNECOLOGY

## 2022-11-08 PROCEDURE — 3075F SYST BP GE 130 - 139MM HG: CPT | Mod: CPTII,S$GLB,, | Performed by: OBSTETRICS & GYNECOLOGY

## 2022-11-08 PROCEDURE — 3079F PR MOST RECENT DIASTOLIC BLOOD PRESSURE 80-89 MM HG: ICD-10-PCS | Mod: CPTII,S$GLB,, | Performed by: OBSTETRICS & GYNECOLOGY

## 2022-11-08 PROCEDURE — 1159F MED LIST DOCD IN RCRD: CPT | Mod: CPTII,S$GLB,, | Performed by: OBSTETRICS & GYNECOLOGY

## 2022-11-08 PROCEDURE — 4010F ACE/ARB THERAPY RXD/TAKEN: CPT | Mod: CPTII,S$GLB,, | Performed by: OBSTETRICS & GYNECOLOGY

## 2022-11-08 PROCEDURE — 1159F PR MEDICATION LIST DOCUMENTED IN MEDICAL RECORD: ICD-10-PCS | Mod: CPTII,S$GLB,, | Performed by: OBSTETRICS & GYNECOLOGY

## 2022-11-08 PROCEDURE — 1160F RVW MEDS BY RX/DR IN RCRD: CPT | Mod: CPTII,S$GLB,, | Performed by: OBSTETRICS & GYNECOLOGY

## 2022-11-08 PROCEDURE — 99214 PR OFFICE/OUTPT VISIT, EST, LEVL IV, 30-39 MIN: ICD-10-PCS | Mod: S$GLB,,, | Performed by: OBSTETRICS & GYNECOLOGY

## 2022-11-08 PROCEDURE — 1160F PR REVIEW ALL MEDS BY PRESCRIBER/CLIN PHARMACIST DOCUMENTED: ICD-10-PCS | Mod: CPTII,S$GLB,, | Performed by: OBSTETRICS & GYNECOLOGY

## 2022-11-08 PROCEDURE — 3008F PR BODY MASS INDEX (BMI) DOCUMENTED: ICD-10-PCS | Mod: CPTII,S$GLB,, | Performed by: OBSTETRICS & GYNECOLOGY

## 2022-11-08 RX ORDER — HYDROCODONE BITARTRATE AND ACETAMINOPHEN 5; 325 MG/1; MG/1
1 TABLET ORAL EVERY 6 HOURS PRN
Qty: 24 TABLET | Refills: 0 | Status: SHIPPED | OUTPATIENT
Start: 2022-11-08 | End: 2022-12-27

## 2022-11-08 RX ORDER — IBUPROFEN 800 MG/1
800 TABLET ORAL 3 TIMES DAILY PRN
Qty: 30 TABLET | Refills: 1 | Status: SHIPPED | OUTPATIENT
Start: 2022-11-08 | End: 2022-12-27

## 2022-11-08 RX ORDER — ONDANSETRON 4 MG/1
4 TABLET, ORALLY DISINTEGRATING ORAL EVERY 8 HOURS PRN
Qty: 20 TABLET | Refills: 1 | Status: SHIPPED | OUTPATIENT
Start: 2022-11-08 | End: 2022-12-27

## 2022-11-08 NOTE — PROGRESS NOTES
PREOPERATIVE HISTORY AND PHYSICAL (TLH with BSO/ CYSTO possible shelly/bso/cysto)  Patient Care Team:  Ofelia Palomo MD as PCP - General (Family Medicine)  Denise Bailey MD (Gastroenterology)  Jermaine Cook MD (Cardiovascular Disease)     CC:  discuss patient's decision for surgery and preoperative counseling    HPI:  Patient is a 52 y.o. who would like to undergo a TLH with BSO/ CYSTO on 2022 but discussed due to size of fibroids, it may be converted to a shelly/bso/cysto if needed.  Pt would like to do bso as well and will try without hrt post op. Pt off her ozempic x 2 weeks. Pt reports she saw Dr Cook last year to evaluate her elevated heart rate and had ekg, echo and didn't have any concerning findings             Indications:  bleeding, fibroids, and failed ablation    LAST ANNUAL :  2022 but no pap done- doing one today HX ABNL PAPS: none  Largest vag delivery- 9#3oz    U/S:  uterus 11.3 x 9.5 x 9.2cm with 7.3cm and 4.9cm fibroids   Ovaries normal            Past Medical History:   Diagnosis Date    Fibroids     Hypercholesteremia     Hypertension     Menorrhagia     PCOS (polycystic ovarian syndrome)     Type 2 diabetes mellitus, without long-term current use of insulin 2020     SURGICALHX:   has a past surgical history that includes Anterior cruciate ligament repair; Endometrial ablation; Tubal ligation; and Knee arthroscopy.    SOCIAL HX:   reports that she quit smoking about 8 years ago. Her smoking use included cigarettes. She started smoking about 13 years ago. She has a 1.25 pack-year smoking history. She has never used smokeless tobacco. She reports current alcohol use of about 2.0 standard drinks per week. She reports that she does not use drugs.    ALLERGIES:  Patient has no known allergies.    Current Outpatient Medications:     buPROPion (WELLBUTRIN XL) 300 MG 24 hr tablet, Take 300 mg by mouth once daily., Disp: , Rfl:     enalapril (VASOTEC) 10 MG  tablet, Take 1 tablet (10 mg total) by mouth 2 (two) times daily., Disp: 180 tablet, Rfl: 0    hydroCHLOROthiazide (MICROZIDE) 12.5 mg capsule, Take 1 capsule (12.5 mg total) by mouth once daily., Disp: 90 capsule, Rfl: 0    metFORMIN (GLUCOPHAGE-XR) 500 MG ER 24hr tablet, Take 1 tablet (500 mg total) by mouth 2 (two) times daily with meals., Disp: 180 tablet, Rfl: 3    OZEMPIC 1 mg/dose (4 mg/3 mL), Inject 1 mg into the skin every 7 days., Disp: , Rfl:     rosuvastatin (CRESTOR) 10 MG tablet, Take 10 mg by mouth every evening., Disp: , Rfl:     valACYclovir (VALTREX) 500 MG tablet, As needed, Disp: , Rfl:     ROS:  CONST:  No fever, chills, fatigue or unexpected changes in weight  CV: No chest pain or palpitations  RESP:  No shortness of breath or cough  GI: No abd pain, vomiting, diarrhea, blood in stool, or changes in bowel mvmts  SKIN: No rashes or lesions  MUSCULOSKELETAL: No joint swelling or pain  PSYCH: No changes in mood or insomia  BREASTS: No asymmetry, lumps, pain, nipple discharge, or skin changes  :  No dysuria, urgency, frequency, hematuria or incontinence          No vag dc, itching, odor or dryness          No pelvic pain, dyspareunia +menorrhagia    Vitals:  Blood pressure 130/86, weight 108.5 kg (239 lb 3.2 oz).  Body mass index is 37.46 kg/m².    PHYSICAL EXAM-  APPEARANCE: Well appearing, in no acute distress.  NECK: Neck symmetric without masses or thyromegaly.  CV:  Normal rate  PULM: Normal resp rate, no resp distress, normal resp effort  PSYCH: Normal mood and affect, cooperative  SKIN: No rashes, lesions, or abnormal bruising  LYMPH: No inguinal or axillary adenopathy  ABD: Soft, without tenderness or masses. No palpable hernias or hsm  BREAST: Symmetrical, no nipple changes, no skin changes, No palpable masses   PELVIC:  VULVA: No lesions. Normal female genitalia.  URETHRAL MEATUS: No masses, no prolapse.  BLADDER/ URETHRA: No masses or suprapubic tenderness  VAGINA: No lesions or  erythema, No discharge.  CVX: No lesions,no cervical motion tenderness.   UTERUS: Enlarged, irregular contour due to fibroids, mobile, non-tender  ADNEXA: No masses, tenderness, or fullness.  ANUS/ PERINEUM: Normal tone.  No lesions.    *female chaperone present for entire exam      ASSESSMENT and PLAN:  Encounter for well woman exam with routine gynecological exam  -     Liquid-based pap smear, screening    Fibroids    Excessive bleeding in premenopausal period    Preoperative exam for gynecologic surgery    Post-op pain  -     HYDROcodone-acetaminophen (NORCO) 5-325 mg per tablet; Take 1 tablet by mouth every 6 (six) hours as needed for Pain.  Dispense: 24 tablet; Refill: 0  -     ibuprofen (ADVIL,MOTRIN) 800 MG tablet; Take 1 tablet (800 mg total) by mouth 3 (three) times daily as needed for Pain.  Dispense: 30 tablet; Refill: 1    Nausea  -     ondansetron (ZOFRAN-ODT) 4 MG TbDL; Take 1 tablet (4 mg total) by mouth every 8 (eight) hours as needed.  Dispense: 20 tablet; Refill: 1      FOLLOWUP:    1-2 week post op appt    PREOPERATIVE COUNSELING:  During the visit, the patients indications to the procedure were reviewed with her as well as the risks, benefits, and alternatives to the procedure. Patient declines any alternatives and expresses her desire to proceed with definitive surgical management.  Reviewed risks of surgery, including but not limited to: risks of anesthesia, infection, bleeding, injury to other organs, such as skin, nerves, arteries, veins, bowel, bladder, ureters, with possible need for reparative or subsequent surgery (such as bowel resection/repair,hernia repair,  bladder/ureter surgery, hysterectomy, removal of tubes and/or ovaries),  blood transfusion, and other unforseen surgical complications.   Postoperative risks of development of deep venous thrombosis, pulmonary embolus, myocardial infarction, infection, and any other postoperative complications, including possible death were also  discussed.The patient verbalizes understanding.   Consents were reviewed and signed with the patient after all questions were answered and patient voiced understanding.  A copy of the preop orders and consents she signed were given to the patient to take home and review to make sure she had no additional questions prior to surgery.   Preoperative instructions and post operative limitations also reviewed with patient and all questions answered and educational materials on her diagnosis and upcoming procedure were offered to patient      Pt desires to proceed with removal of ovaries.  We had a full discussion of risks and benefits of removal of ovaries (surgical menopause) during the surgery. The benefit-risk ratio of hormonal replacement therapy was discussed for if the ovaries are removed. Improvement in vasomotor and other climacteric symptoms is discussed, including possible improvements in sleep and mood.We discussed the increased risk of thrombo-embolic events such as myocardial infarction, stroke and also possibly breast cancer with estrogen replacement, and how this might affect her. The range of side effects such as breast tenderness, weight gain, possible increases in lifetime risk of breast cancer and possible thrombotic complications was discussed. We also discussed ACOG's recommendation to use hormone replacement therapy for the relief of hot flashes alone and to be on the lowest dose possible for the shortest amount of time.     *Total time spent: 40 minutes with 50 % or more spent on counseling about diagnosis, treatment options, and coordination of care.

## 2022-11-10 ENCOUNTER — PATIENT MESSAGE (OUTPATIENT)
Dept: OBSTETRICS AND GYNECOLOGY | Facility: CLINIC | Age: 52
End: 2022-11-10
Payer: COMMERCIAL

## 2022-11-10 LAB
FSH: 10.2 IU/L
Lab: NORMAL

## 2022-11-14 ENCOUNTER — OUTSIDE PLACE OF SERVICE (OUTPATIENT)
Dept: OBSTETRICS AND GYNECOLOGY | Facility: CLINIC | Age: 52
End: 2022-11-14

## 2022-11-14 LAB
BANDS: 8 % (ref 0–5)
CELLS COUNTED: 100
ERYTHROCYTE [DISTWIDTH] IN BLOOD BY AUTOMATED COUNT: 13.2 % (ref 12.5–18)
HCT VFR BLD AUTO: 38.2 % (ref 37–47)
HGB BLD-MCNC: 12.4 G/DL (ref 12–16)
LYMPHOCYTES NFR BLD: 9 % (ref 25–40)
MCH RBC QN AUTO: 30.5 PG (ref 27–31.2)
MCHC RBC AUTO-ENTMCNC: 32.5 G/DL (ref 31.8–35.4)
MCV RBC AUTO: 94.1 FL (ref 80–97)
METAMYELOCYTES # BLD MANUAL: 1 % (ref 0–0)
MONOCYTES NFR BLD: 1 % (ref 1–15)
NEUTROPHILS # BLD AUTO: 17.2 10*3/UL (ref 1.8–7.7)
NEUTROPHILS NFR BLD: 81 % (ref 37–80)
NUCLEATED RED BLOOD CELLS: 0 %
PLATELETS: 349 10*3/UL (ref 142–424)
RBC # BLD AUTO: 4.06 10*6/UL (ref 4.2–5.4)
RBC MORPH BLD: ABNORMAL
SMALL PLATELETS BLD QL SMEAR: ADEQUATE
WBC # BLD: 19.3 10*3/UL (ref 4.6–10.2)

## 2022-11-14 PROCEDURE — 58150 PR TOTAL ABDOM HYSTERECTOMY: ICD-10-PCS | Mod: 80,,, | Performed by: OBSTETRICS & GYNECOLOGY

## 2022-11-14 PROCEDURE — 58150 PR TOTAL ABDOM HYSTERECTOMY: ICD-10-PCS | Mod: ,,, | Performed by: OBSTETRICS & GYNECOLOGY

## 2022-11-14 PROCEDURE — 58150 TOTAL HYSTERECTOMY: CPT | Mod: 80,,, | Performed by: OBSTETRICS & GYNECOLOGY

## 2022-11-14 PROCEDURE — 58150 TOTAL HYSTERECTOMY: CPT | Mod: ,,, | Performed by: OBSTETRICS & GYNECOLOGY

## 2022-11-15 ENCOUNTER — TELEPHONE (OUTPATIENT)
Dept: OBSTETRICS AND GYNECOLOGY | Facility: CLINIC | Age: 52
End: 2022-11-15
Payer: COMMERCIAL

## 2022-11-15 LAB
CELLS COUNTED: 100
ERYTHROCYTE [DISTWIDTH] IN BLOOD BY AUTOMATED COUNT: 13.4 % (ref 12.5–18)
HCT VFR BLD AUTO: 34.9 % (ref 37–47)
HGB BLD-MCNC: 11.5 G/DL (ref 12–16)
LYMPHOCYTES NFR BLD: 4 % (ref 25–40)
MCH RBC QN AUTO: 30.4 PG (ref 27–31.2)
MCHC RBC AUTO-ENTMCNC: 33 G/DL (ref 31.8–35.4)
MCV RBC AUTO: 92.3 FL (ref 80–97)
MONOCYTES NFR BLD: 9 % (ref 1–15)
NEUTROPHILS # BLD AUTO: 16.6 10*3/UL (ref 1.8–7.7)
NEUTROPHILS NFR BLD: 87 % (ref 37–80)
NUCLEATED RED BLOOD CELLS: 0 %
PLATELETS: 361 10*3/UL (ref 142–424)
RBC # BLD AUTO: 3.78 10*6/UL (ref 4.2–5.4)
RBC MORPH BLD: ABNORMAL
SMALL PLATELETS BLD QL SMEAR: NORMAL
SPECIMEN TO PATHOLOGY: NORMAL
WBC # BLD: 19.1 10*3/UL (ref 4.6–10.2)

## 2022-11-15 NOTE — TELEPHONE ENCOUNTER
----- Message from Anisha Neff MD sent at 11/15/2022 12:44 PM CST -----  Please call pt and let her know her path report came back from her hysterectomy yesterday and all is benign.  She was going home this am.  It weighed 465grams which is just over 1 pound

## 2022-11-15 NOTE — PROGRESS NOTES
Please call pt and let her know her path report came back from her hysterectomy yesterday and all is benign.  She was going home this am.  It weighed 465grams which is just over 1 pound

## 2022-11-15 NOTE — TELEPHONE ENCOUNTER
Spoke with patient. Two pt identifiers confirmed. Notified patient of her pathology results. Patient verbalized understanding.

## 2022-11-17 ENCOUNTER — OFFICE VISIT (OUTPATIENT)
Dept: OBSTETRICS AND GYNECOLOGY | Facility: CLINIC | Age: 52
End: 2022-11-17
Payer: COMMERCIAL

## 2022-11-17 ENCOUNTER — PATIENT MESSAGE (OUTPATIENT)
Dept: FAMILY MEDICINE | Facility: CLINIC | Age: 52
End: 2022-11-17
Payer: COMMERCIAL

## 2022-11-17 VITALS
DIASTOLIC BLOOD PRESSURE: 84 MMHG | SYSTOLIC BLOOD PRESSURE: 137 MMHG | BODY MASS INDEX: 36.88 KG/M2 | WEIGHT: 235 LBS | HEART RATE: 106 BPM | HEIGHT: 67 IN

## 2022-11-17 DIAGNOSIS — Z98.890 POST-OPERATIVE STATE: Primary | ICD-10-CM

## 2022-11-17 PROCEDURE — 3079F DIAST BP 80-89 MM HG: CPT | Mod: CPTII,S$GLB,, | Performed by: OBSTETRICS & GYNECOLOGY

## 2022-11-17 PROCEDURE — 3008F PR BODY MASS INDEX (BMI) DOCUMENTED: ICD-10-PCS | Mod: CPTII,S$GLB,, | Performed by: OBSTETRICS & GYNECOLOGY

## 2022-11-17 PROCEDURE — 4010F ACE/ARB THERAPY RXD/TAKEN: CPT | Mod: CPTII,S$GLB,, | Performed by: OBSTETRICS & GYNECOLOGY

## 2022-11-17 PROCEDURE — 3075F SYST BP GE 130 - 139MM HG: CPT | Mod: CPTII,S$GLB,, | Performed by: OBSTETRICS & GYNECOLOGY

## 2022-11-17 PROCEDURE — 3008F BODY MASS INDEX DOCD: CPT | Mod: CPTII,S$GLB,, | Performed by: OBSTETRICS & GYNECOLOGY

## 2022-11-17 PROCEDURE — 99024 POSTOP FOLLOW-UP VISIT: CPT | Mod: S$GLB,,, | Performed by: OBSTETRICS & GYNECOLOGY

## 2022-11-17 PROCEDURE — 3079F PR MOST RECENT DIASTOLIC BLOOD PRESSURE 80-89 MM HG: ICD-10-PCS | Mod: CPTII,S$GLB,, | Performed by: OBSTETRICS & GYNECOLOGY

## 2022-11-17 PROCEDURE — 4010F PR ACE/ARB THEARPY RXD/TAKEN: ICD-10-PCS | Mod: CPTII,S$GLB,, | Performed by: OBSTETRICS & GYNECOLOGY

## 2022-11-17 PROCEDURE — 99024 PR POST-OP FOLLOW-UP VISIT: ICD-10-PCS | Mod: S$GLB,,, | Performed by: OBSTETRICS & GYNECOLOGY

## 2022-11-17 PROCEDURE — 3075F PR MOST RECENT SYSTOLIC BLOOD PRESS GE 130-139MM HG: ICD-10-PCS | Mod: CPTII,S$GLB,, | Performed by: OBSTETRICS & GYNECOLOGY

## 2022-11-17 PROCEDURE — 3044F PR MOST RECENT HEMOGLOBIN A1C LEVEL <7.0%: ICD-10-PCS | Mod: CPTII,S$GLB,, | Performed by: OBSTETRICS & GYNECOLOGY

## 2022-11-17 PROCEDURE — 3044F HG A1C LEVEL LT 7.0%: CPT | Mod: CPTII,S$GLB,, | Performed by: OBSTETRICS & GYNECOLOGY

## 2022-11-17 NOTE — PROGRESS NOTES
1 WEEK POST OP NOTE (hyst):    Patient is a 52 y.o.  here for post-op visit from a [facility] with BSO/ CYSTO on 2022 for bleeding and fibroids    The procedure and hospitalization occurred without complications and the operative findings were again reviewed with the patient.   Pathology results were benign and were reviewed with the patient today.   Post operative limitations were also discussed and all questions answered.    Patient wanted to check left side of incision as it had a little bit of blood on it this am. Pain overall decreasing and feeling better each day. Area of incision not any more painful than rest of incision.      Current Outpatient Medications:     buPROPion (WELLBUTRIN XL) 300 MG 24 hr tablet, Take 300 mg by mouth once daily., Disp: , Rfl:     enalapril (VASOTEC) 10 MG tablet, Take 1 tablet (10 mg total) by mouth 2 (two) times daily., Disp: 180 tablet, Rfl: 0    hydroCHLOROthiazide (MICROZIDE) 12.5 mg capsule, Take 1 capsule (12.5 mg total) by mouth once daily., Disp: 90 capsule, Rfl: 0    HYDROcodone-acetaminophen (NORCO) 5-325 mg per tablet, Take 1 tablet by mouth every 6 (six) hours as needed for Pain., Disp: 24 tablet, Rfl: 0    ibuprofen (ADVIL,MOTRIN) 800 MG tablet, Take 1 tablet (800 mg total) by mouth 3 (three) times daily as needed for Pain., Disp: 30 tablet, Rfl: 1    metFORMIN (GLUCOPHAGE-XR) 500 MG ER 24hr tablet, Take 1 tablet (500 mg total) by mouth 2 (two) times daily with meals., Disp: 180 tablet, Rfl: 3    ondansetron (ZOFRAN-ODT) 4 MG TbDL, Take 1 tablet (4 mg total) by mouth every 8 (eight) hours as needed., Disp: 20 tablet, Rfl: 1    OZEMPIC 1 mg/dose (4 mg/3 mL), Inject 1 mg into the skin every 7 days., Disp: , Rfl:     rosuvastatin (CRESTOR) 10 MG tablet, Take 10 mg by mouth every evening., Disp: , Rfl:     valACYclovir (VALTREX) 500 MG tablet, As needed, Disp: , Rfl:     PHYSICAL EXAM:    Vitals:    22 1016   BP: 137/84   Pulse: 106   Weight: 106.6 kg  "(235 lb)   Height: 5' 7" (1.702 m)   PainSc:   2       APPEARANCE: Well appearing in in no acute distress.   CHEST: good respiratory effort, no cough or respiratory distress  ABDOMEN: Soft, non distended.  No masses, tenderness, rashes  Incisions: intact, clean and dry, and healing well with no erythema,drainage, swelling of signs of infection. Left side of incision had a small amt of dried blood by incision. No active bleeding, bruising, or hematoma. Added a few steristrips to area and reassured pt              PELVIC: deferred.  EXT: no edema or calf tenderness    ASSESSMENT AND PLAN:  -s/p FREDRICK with BSO/ CYSTO   -Continue with same post op restrictions    FOLLOWUP:  Appt for1-2 week post op visit       "

## 2022-11-21 ENCOUNTER — OFFICE VISIT (OUTPATIENT)
Dept: OBSTETRICS AND GYNECOLOGY | Facility: CLINIC | Age: 52
End: 2022-11-21
Payer: COMMERCIAL

## 2022-11-21 VITALS — WEIGHT: 232 LBS | BODY MASS INDEX: 36.34 KG/M2

## 2022-11-21 DIAGNOSIS — Z98.890 POST-OPERATIVE STATE: Primary | ICD-10-CM

## 2022-11-21 PROCEDURE — 3044F HG A1C LEVEL LT 7.0%: CPT | Mod: CPTII,S$GLB,, | Performed by: OBSTETRICS & GYNECOLOGY

## 2022-11-21 PROCEDURE — 3008F BODY MASS INDEX DOCD: CPT | Mod: CPTII,S$GLB,, | Performed by: OBSTETRICS & GYNECOLOGY

## 2022-11-21 PROCEDURE — 3008F PR BODY MASS INDEX (BMI) DOCUMENTED: ICD-10-PCS | Mod: CPTII,S$GLB,, | Performed by: OBSTETRICS & GYNECOLOGY

## 2022-11-21 PROCEDURE — 99024 PR POST-OP FOLLOW-UP VISIT: ICD-10-PCS | Mod: S$GLB,,, | Performed by: OBSTETRICS & GYNECOLOGY

## 2022-11-21 PROCEDURE — 1159F PR MEDICATION LIST DOCUMENTED IN MEDICAL RECORD: ICD-10-PCS | Mod: CPTII,S$GLB,, | Performed by: OBSTETRICS & GYNECOLOGY

## 2022-11-21 PROCEDURE — 1159F MED LIST DOCD IN RCRD: CPT | Mod: CPTII,S$GLB,, | Performed by: OBSTETRICS & GYNECOLOGY

## 2022-11-21 PROCEDURE — 3044F PR MOST RECENT HEMOGLOBIN A1C LEVEL <7.0%: ICD-10-PCS | Mod: CPTII,S$GLB,, | Performed by: OBSTETRICS & GYNECOLOGY

## 2022-11-21 PROCEDURE — 4010F PR ACE/ARB THEARPY RXD/TAKEN: ICD-10-PCS | Mod: CPTII,S$GLB,, | Performed by: OBSTETRICS & GYNECOLOGY

## 2022-11-21 PROCEDURE — 1160F RVW MEDS BY RX/DR IN RCRD: CPT | Mod: CPTII,S$GLB,, | Performed by: OBSTETRICS & GYNECOLOGY

## 2022-11-21 PROCEDURE — 99024 POSTOP FOLLOW-UP VISIT: CPT | Mod: S$GLB,,, | Performed by: OBSTETRICS & GYNECOLOGY

## 2022-11-21 PROCEDURE — 4010F ACE/ARB THERAPY RXD/TAKEN: CPT | Mod: CPTII,S$GLB,, | Performed by: OBSTETRICS & GYNECOLOGY

## 2022-11-21 PROCEDURE — 1160F PR REVIEW ALL MEDS BY PRESCRIBER/CLIN PHARMACIST DOCUMENTED: ICD-10-PCS | Mod: CPTII,S$GLB,, | Performed by: OBSTETRICS & GYNECOLOGY

## 2022-11-21 NOTE — PROGRESS NOTES
1 WEEK POST OP NOTE (hyst):    Patient is a 52 y.o.  here for post-op visit from a [facility] with BSO/ CYSTO on 2022 for bleeding and fibroids    The procedure and hospitalization occurred without complications and the operative findings were again reviewed with the patient.   Pathology results were benign and were reviewed with the patient today.   Post operative limitations were also discussed and all questions answered.    Patient feeling better everyday.  Incision doing well since steristrips last week.       Current Outpatient Medications:     buPROPion (WELLBUTRIN XL) 300 MG 24 hr tablet, Take 300 mg by mouth once daily., Disp: , Rfl:     enalapril (VASOTEC) 10 MG tablet, Take 1 tablet (10 mg total) by mouth 2 (two) times daily., Disp: 180 tablet, Rfl: 0    hydroCHLOROthiazide (MICROZIDE) 12.5 mg capsule, Take 1 capsule (12.5 mg total) by mouth once daily., Disp: 90 capsule, Rfl: 0    HYDROcodone-acetaminophen (NORCO) 5-325 mg per tablet, Take 1 tablet by mouth every 6 (six) hours as needed for Pain., Disp: 24 tablet, Rfl: 0    ibuprofen (ADVIL,MOTRIN) 800 MG tablet, Take 1 tablet (800 mg total) by mouth 3 (three) times daily as needed for Pain., Disp: 30 tablet, Rfl: 1    metFORMIN (GLUCOPHAGE-XR) 500 MG ER 24hr tablet, Take 1 tablet (500 mg total) by mouth 2 (two) times daily with meals., Disp: 180 tablet, Rfl: 3    ondansetron (ZOFRAN-ODT) 4 MG TbDL, Take 1 tablet (4 mg total) by mouth every 8 (eight) hours as needed., Disp: 20 tablet, Rfl: 1    OZEMPIC 1 mg/dose (4 mg/3 mL), Inject 1 mg into the skin every 7 days., Disp: , Rfl:     rosuvastatin (CRESTOR) 10 MG tablet, Take 10 mg by mouth every evening., Disp: , Rfl:     valACYclovir (VALTREX) 500 MG tablet, As needed, Disp: , Rfl:     PHYSICAL EXAM:    Vitals:    22 1509   Weight: 105.2 kg (232 lb)       APPEARANCE: Well appearing in in no acute distress.   CHEST: good respiratory effort, no cough or respiratory distress  ABDOMEN: Soft,  non distended.  No masses, tenderness, rashes  Incisions: intact, clean and dry, and healing well with no erythema,drainage, swelling of signs of infection. Steristrips still in place and dry  PELVIC: deferred.  EXT: no edema or calf tenderness    ASSESSMENT AND PLAN:  -s/p TLH with BSO/ CYSTO   -Continue with pelvic rest/ no tub baths or swimming until next post op appt and no heavy lifting/ straining until next post op appt    FOLLOWUP:  Appt for 6 week post op visit

## 2022-12-27 ENCOUNTER — OFFICE VISIT (OUTPATIENT)
Dept: OBSTETRICS AND GYNECOLOGY | Facility: CLINIC | Age: 52
End: 2022-12-27
Payer: COMMERCIAL

## 2022-12-27 VITALS
DIASTOLIC BLOOD PRESSURE: 80 MMHG | SYSTOLIC BLOOD PRESSURE: 135 MMHG | BODY MASS INDEX: 37.04 KG/M2 | HEIGHT: 67 IN | WEIGHT: 236 LBS

## 2022-12-27 DIAGNOSIS — Z98.890 POST-OPERATIVE STATE: Primary | ICD-10-CM

## 2022-12-27 PROCEDURE — 3075F SYST BP GE 130 - 139MM HG: CPT | Mod: CPTII,S$GLB,, | Performed by: OBSTETRICS & GYNECOLOGY

## 2022-12-27 PROCEDURE — 3008F BODY MASS INDEX DOCD: CPT | Mod: CPTII,S$GLB,, | Performed by: OBSTETRICS & GYNECOLOGY

## 2022-12-27 PROCEDURE — 3008F PR BODY MASS INDEX (BMI) DOCUMENTED: ICD-10-PCS | Mod: CPTII,S$GLB,, | Performed by: OBSTETRICS & GYNECOLOGY

## 2022-12-27 PROCEDURE — 99024 PR POST-OP FOLLOW-UP VISIT: ICD-10-PCS | Mod: S$GLB,,, | Performed by: OBSTETRICS & GYNECOLOGY

## 2022-12-27 PROCEDURE — 1159F PR MEDICATION LIST DOCUMENTED IN MEDICAL RECORD: ICD-10-PCS | Mod: CPTII,S$GLB,, | Performed by: OBSTETRICS & GYNECOLOGY

## 2022-12-27 PROCEDURE — 3079F PR MOST RECENT DIASTOLIC BLOOD PRESSURE 80-89 MM HG: ICD-10-PCS | Mod: CPTII,S$GLB,, | Performed by: OBSTETRICS & GYNECOLOGY

## 2022-12-27 PROCEDURE — 4010F PR ACE/ARB THEARPY RXD/TAKEN: ICD-10-PCS | Mod: CPTII,S$GLB,, | Performed by: OBSTETRICS & GYNECOLOGY

## 2022-12-27 PROCEDURE — 3044F PR MOST RECENT HEMOGLOBIN A1C LEVEL <7.0%: ICD-10-PCS | Mod: CPTII,S$GLB,, | Performed by: OBSTETRICS & GYNECOLOGY

## 2022-12-27 PROCEDURE — 4010F ACE/ARB THERAPY RXD/TAKEN: CPT | Mod: CPTII,S$GLB,, | Performed by: OBSTETRICS & GYNECOLOGY

## 2022-12-27 PROCEDURE — 3044F HG A1C LEVEL LT 7.0%: CPT | Mod: CPTII,S$GLB,, | Performed by: OBSTETRICS & GYNECOLOGY

## 2022-12-27 PROCEDURE — 3075F PR MOST RECENT SYSTOLIC BLOOD PRESS GE 130-139MM HG: ICD-10-PCS | Mod: CPTII,S$GLB,, | Performed by: OBSTETRICS & GYNECOLOGY

## 2022-12-27 PROCEDURE — 3079F DIAST BP 80-89 MM HG: CPT | Mod: CPTII,S$GLB,, | Performed by: OBSTETRICS & GYNECOLOGY

## 2022-12-27 PROCEDURE — 99024 POSTOP FOLLOW-UP VISIT: CPT | Mod: S$GLB,,, | Performed by: OBSTETRICS & GYNECOLOGY

## 2022-12-27 PROCEDURE — 1159F MED LIST DOCD IN RCRD: CPT | Mod: CPTII,S$GLB,, | Performed by: OBSTETRICS & GYNECOLOGY

## 2022-12-27 RX ORDER — SEMAGLUTIDE 1.34 MG/ML
1 INJECTION, SOLUTION SUBCUTANEOUS
COMMUNITY
Start: 2022-11-21 | End: 2024-01-16

## 2022-12-27 NOTE — PROGRESS NOTES
"6 WEEK POST OP NOTE ([facility] with BSO/ CYSTO):  Patient Care Team:  Ofelia Palomo MD as PCP - General (Family Medicine)  Denise Bailey MD (Gastroenterology)  Jermaine Cook MD (Cardiovascular Disease)    LAST ANNUAL :  2022    Patient is a 52 y.o.  here for post-op visit from a  [facility] with BSO/ CYSTO on 2022 for menorrhagia and fibroids     The procedure and hospitalization occured without complications and the pathology results were benign. Reviewed post op activity restrictions and all questions answered.    Patient without complaints- no bleeding, dc, pain or other complaints. 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       Current Outpatient Medications:     buPROPion (WELLBUTRIN XL) 300 MG 24 hr tablet, Take 300 mg by mouth once daily., Disp: , Rfl:     enalapril (VASOTEC) 10 MG tablet, Take 1 tablet (10 mg total) by mouth 2 (two) times daily., Disp: 180 tablet, Rfl: 0    hydroCHLOROthiazide (MICROZIDE) 12.5 mg capsule, Take 1 capsule (12.5 mg total) by mouth once daily., Disp: 90 capsule, Rfl: 0    metFORMIN (GLUCOPHAGE-XR) 500 MG ER 24hr tablet, Take 1 tablet (500 mg total) by mouth 2 (two) times daily with meals., Disp: 180 tablet, Rfl: 3    OZEMPIC 0.25 mg or 0.5 mg(2 mg/1.5 mL) pen injector, Inject 1 mg into the skin every 7 days., Disp: , Rfl:     rosuvastatin (CRESTOR) 10 MG tablet, Take 10 mg by mouth every evening., Disp: , Rfl:     valACYclovir (VALTREX) 500 MG tablet, As needed, Disp: , Rfl:     OZEMPIC 1 mg/dose (4 mg/3 mL), Inject 1 mg into the skin every 7 days., Disp: , Rfl:     PHYSICAL EXAM:    Blood pressure 135/80, height 5' 7" (1.702 m), weight 107 kg (236 lb).    APPEARANCE: Well appearing, in no acute distress.   CHEST: good respiratory effort, no cough or respiratory distress  ABDOMEN: Soft, non distended.  No masses, tenderness, rashes              Incisions: well healed with no erythema, drainage, swelling or signs of infection  PELVIC: " vaginal cuff intact, with no erythema, no vaginal bleeding, no abnormal vaginal discharge   _left angle has one suture visible but nontender  EXT: no edema or calf tenderness    *female chaperone present for entire exam    ASSESSMENT AND PLAN:  -s/p FREDRICK with BSO/ CYSTO     FOLLOWUP:  -2 weeks to recheck cuff   -continue pelvic rest and no strenuous activity or heavy lifting

## 2023-01-04 ENCOUNTER — PATIENT MESSAGE (OUTPATIENT)
Dept: OBSTETRICS AND GYNECOLOGY | Facility: CLINIC | Age: 53
End: 2023-01-04
Payer: COMMERCIAL

## 2023-01-09 NOTE — PROGRESS NOTES
"8 WEEK POST OP NOTE ([facility] with BSO/ CYSTO):  Patient Care Team:  Ofelia Palomo MD as PCP - General (Family Medicine)  Denise Bailey MD (Gastroenterology)  Jermaine Cook MD (Cardiovascular Disease)    LAST ANNUAL :  2022  LAST MMG (screening)- 2022- normal at [facility]    Patient is a 52 y.o.  here for post-op visit from a  [facility] with BSO/ CYSTO on 2022 for menorrhagia, failed endometrial ablation, and fibroids     The procedure and hospitalization occured without complications and the pathology results were benign.   Patient feeling well and without any concerns    Current Outpatient Medications:     buPROPion (WELLBUTRIN XL) 300 MG 24 hr tablet, Take 300 mg by mouth once daily., Disp: , Rfl:     enalapril (VASOTEC) 10 MG tablet, Take 1 tablet (10 mg total) by mouth 2 (two) times daily., Disp: 180 tablet, Rfl: 0    hydroCHLOROthiazide (MICROZIDE) 12.5 mg capsule, Take 1 capsule (12.5 mg total) by mouth once daily., Disp: 90 capsule, Rfl: 0    OZEMPIC 0.25 mg or 0.5 mg(2 mg/1.5 mL) pen injector, Inject 1 mg into the skin every 7 days., Disp: , Rfl:     OZEMPIC 1 mg/dose (4 mg/3 mL), Inject 1 mg into the skin every 7 days., Disp: , Rfl:     rosuvastatin (CRESTOR) 10 MG tablet, Take 10 mg by mouth every evening., Disp: , Rfl:     valACYclovir (VALTREX) 500 MG tablet, As needed, Disp: , Rfl:     metFORMIN (GLUCOPHAGE-XR) 500 MG ER 24hr tablet, Take 1 tablet (500 mg total) by mouth 2 (two) times daily with meals., Disp: 180 tablet, Rfl: 3    PHYSICAL EXAM:    Blood pressure 135/78, height 5' 7" (1.702 m), weight 106.6 kg (235 lb).    APPEARANCE: Well appearing, in no acute distress.   CHEST: good respiratory effort, no cough or respiratory distress  ABDOMEN: Soft, non distended.  No masses, tenderness, rashes              Incisions: well healed   PELVIC: vaginal cuff intact, with no erythema, no vaginal bleeding, no abnormal vaginal discharge,   No granulation tissue or " visible sutures present. Bimanual exam-no tenderness or masses at vaginal cuff  EXT: no edema or calf tenderness  *female chaperone present for entire exam    ASSESSMENT AND PLAN:  -s/p FREDRICK with BSO/ CYSTO doing well    FOLLOWUP:  For wwe or prn  Screening mmg order given

## 2023-01-10 ENCOUNTER — OFFICE VISIT (OUTPATIENT)
Dept: OBSTETRICS AND GYNECOLOGY | Facility: CLINIC | Age: 53
End: 2023-01-10
Payer: COMMERCIAL

## 2023-01-10 VITALS
DIASTOLIC BLOOD PRESSURE: 78 MMHG | WEIGHT: 235 LBS | HEIGHT: 67 IN | SYSTOLIC BLOOD PRESSURE: 135 MMHG | BODY MASS INDEX: 36.88 KG/M2

## 2023-01-10 DIAGNOSIS — Z12.31 BREAST CANCER SCREENING BY MAMMOGRAM: Primary | ICD-10-CM

## 2023-01-10 PROCEDURE — 3075F PR MOST RECENT SYSTOLIC BLOOD PRESS GE 130-139MM HG: ICD-10-PCS | Mod: CPTII,S$GLB,, | Performed by: OBSTETRICS & GYNECOLOGY

## 2023-01-10 PROCEDURE — 3078F DIAST BP <80 MM HG: CPT | Mod: CPTII,S$GLB,, | Performed by: OBSTETRICS & GYNECOLOGY

## 2023-01-10 PROCEDURE — 3075F SYST BP GE 130 - 139MM HG: CPT | Mod: CPTII,S$GLB,, | Performed by: OBSTETRICS & GYNECOLOGY

## 2023-01-10 PROCEDURE — 3078F PR MOST RECENT DIASTOLIC BLOOD PRESSURE < 80 MM HG: ICD-10-PCS | Mod: CPTII,S$GLB,, | Performed by: OBSTETRICS & GYNECOLOGY

## 2023-01-10 PROCEDURE — 3008F BODY MASS INDEX DOCD: CPT | Mod: CPTII,S$GLB,, | Performed by: OBSTETRICS & GYNECOLOGY

## 2023-01-10 PROCEDURE — 99024 POSTOP FOLLOW-UP VISIT: CPT | Mod: S$GLB,,, | Performed by: OBSTETRICS & GYNECOLOGY

## 2023-01-10 PROCEDURE — 1159F MED LIST DOCD IN RCRD: CPT | Mod: CPTII,S$GLB,, | Performed by: OBSTETRICS & GYNECOLOGY

## 2023-01-10 PROCEDURE — 99024 PR POST-OP FOLLOW-UP VISIT: ICD-10-PCS | Mod: S$GLB,,, | Performed by: OBSTETRICS & GYNECOLOGY

## 2023-01-10 PROCEDURE — 1159F PR MEDICATION LIST DOCUMENTED IN MEDICAL RECORD: ICD-10-PCS | Mod: CPTII,S$GLB,, | Performed by: OBSTETRICS & GYNECOLOGY

## 2023-01-10 PROCEDURE — 3008F PR BODY MASS INDEX (BMI) DOCUMENTED: ICD-10-PCS | Mod: CPTII,S$GLB,, | Performed by: OBSTETRICS & GYNECOLOGY

## 2023-02-15 ENCOUNTER — PATIENT MESSAGE (OUTPATIENT)
Dept: OBSTETRICS AND GYNECOLOGY | Facility: CLINIC | Age: 53
End: 2023-02-15
Payer: COMMERCIAL

## 2023-02-17 ENCOUNTER — PATIENT MESSAGE (OUTPATIENT)
Dept: OBSTETRICS AND GYNECOLOGY | Facility: CLINIC | Age: 53
End: 2023-02-17
Payer: COMMERCIAL

## 2023-02-17 DIAGNOSIS — R35.0 URINARY FREQUENCY: Primary | ICD-10-CM

## 2023-02-17 NOTE — TELEPHONE ENCOUNTER
It wouldn't hurt to check a ua and ucx but if no infection there then she may benefit from some myrbetriq. We should have some 50mg samples we can put up front to try. It will help resolve any bladder spasm she is having which can definitely make her go more often at night

## 2023-02-24 LAB
AMORPH URATE CRY URNS QL MICRO: ABNORMAL
BACTERIA #/AREA URNS HPF: ABNORMAL /[HPF]
BILIRUB UR QL STRIP: NEGATIVE
CLARITY UR: ABNORMAL
COLOR UR: YELLOW
EPITHELIAL CELLS: ABNORMAL
GLUCOSE (UA): NEGATIVE MG/DL
KETONES UR QL STRIP: NEGATIVE MG/DL
LEUKOCYTE ESTERASE UR QL STRIP: ABNORMAL
MUCOUS THREADS URNS QL MICRO: NEGATIVE
NITRITE UR QL STRIP: NEGATIVE
OCCULT BLOOD: NEGATIVE
PH, URINE: 5 (ref 5–7.5)
PROT UR QL STRIP: NEGATIVE MG/DL
RBC/HPF: ABNORMAL
SP GR UR STRIP: 1.03 (ref 1–1.03)
URINE CULTURE, ROUTINE: NORMAL
UROBILINOGEN, URINE: NORMAL E.U./DL (ref 0–1)
WBC/HPF: ABNORMAL

## 2023-02-26 NOTE — PROGRESS NOTES
Please call pt and let her know this urine sample wasn't a good clean catch and the ucx grew out lots of skin bacteria (contaminants) so it doesnt tell us a lot. Repeating it would be ideal. But have her come  some myrbetriq 50mg samples to take qd for a couple weeks and see if that calms down the urinary frequency she is having

## 2023-02-27 ENCOUNTER — PATIENT MESSAGE (OUTPATIENT)
Dept: OBSTETRICS AND GYNECOLOGY | Facility: CLINIC | Age: 53
End: 2023-02-27
Payer: COMMERCIAL

## 2023-02-27 DIAGNOSIS — R35.0 URINARY FREQUENCY: Primary | ICD-10-CM

## 2023-02-27 NOTE — TELEPHONE ENCOUNTER
Renetta would like to do another urinalysis before starting a medication. Please send urinalysis to the path lab. Thanks

## 2023-02-27 NOTE — TELEPHONE ENCOUNTER
That is fine if she prefers to do the ua first. But if she wants to start the myrbetriq at the same time, she can as it reduces bladder spasms and may help her symptoms. Bladder spasms can be from uti but sometimes occur with no infection also. So she can do both if she chooses

## 2023-02-28 NOTE — PROGRESS NOTES
Please let pt know this is a MUCH better urine specimen this time and only has a trace of leukocytes so we will see what culture shows in terms of any infection but probably not. Also this urine showed some calcium oxalate crystals which are often seen in the presence of kidney stones. So we will set her up with urology once we get the ucx back to evaluate her for stones. She can definitely try the myrbetriq for symptom relief though

## 2023-03-02 DIAGNOSIS — R30.0 DYSURIA: Primary | ICD-10-CM

## 2023-03-02 LAB
AMORPH URATE CRY URNS QL MICRO: NEGATIVE
BACTERIA #/AREA URNS HPF: ABNORMAL /[HPF]
BILIRUB UR QL STRIP: NEGATIVE
CALCIUM OXALATE: ABNORMAL
CLARITY UR: CLEAR
COLOR UR: YELLOW
EPITHELIAL CELLS: ABNORMAL
GLUCOSE (UA): NEGATIVE MG/DL
KETONES UR QL STRIP: NEGATIVE MG/DL
LEUKOCYTE ESTERASE UR QL STRIP: ABNORMAL
MUCOUS THREADS URNS QL MICRO: NEGATIVE
NITRITE UR QL STRIP: NEGATIVE
OCCULT BLOOD: NEGATIVE
PH, URINE: 6 (ref 5–7.5)
PROT UR QL STRIP: NEGATIVE MG/DL
RBC/HPF: NEGATIVE
SP GR UR STRIP: 1.02 (ref 1–1.03)
URINE CULTURE, ROUTINE: NORMAL
UROBILINOGEN, URINE: NORMAL E.U./DL (ref 0–1)
WBC/HPF: ABNORMAL

## 2023-03-02 RX ORDER — SULFAMETHOXAZOLE AND TRIMETHOPRIM 800; 160 MG/1; MG/1
1 TABLET ORAL 2 TIMES DAILY
Qty: 14 TABLET | Refills: 0 | Status: SHIPPED | OUTPATIENT
Start: 2023-03-02 | End: 2023-03-09

## 2023-03-02 NOTE — PROGRESS NOTES
Please let pt know this urine culture just grew out a combination of bacteria usu found on the skin. However, this time she had a great clean catch so its not impossible there is some bacteria similar to these could be still present in the bladder. SO I am going to give her one last abx to cover any of these (gram positive) possible bacteria.  If it works, great. If not, she will be seeing urology to work her up for stones in the future anyway.

## 2023-03-09 ENCOUNTER — TELEPHONE (OUTPATIENT)
Dept: FAMILY MEDICINE | Facility: CLINIC | Age: 53
End: 2023-03-09
Payer: COMMERCIAL

## 2023-03-09 NOTE — TELEPHONE ENCOUNTER
----- Message from Indigo Monreal sent at 3/9/2023  3:24 PM CST -----  Contact: Domingo from [employer]  Is calling to follow up on a request for records on 2/08 and can be reached at [phone]

## 2023-03-09 NOTE — TELEPHONE ENCOUNTER
Called to let them know that we did not receive the medical records request from them and they will e-fax it back to us

## 2023-03-15 ENCOUNTER — PATIENT MESSAGE (OUTPATIENT)
Dept: OBSTETRICS AND GYNECOLOGY | Facility: CLINIC | Age: 53
End: 2023-03-15
Payer: COMMERCIAL

## 2023-05-08 ENCOUNTER — PATIENT MESSAGE (OUTPATIENT)
Dept: OBSTETRICS AND GYNECOLOGY | Facility: CLINIC | Age: 53
End: 2023-05-08
Payer: COMMERCIAL

## 2023-05-22 ENCOUNTER — PATIENT MESSAGE (OUTPATIENT)
Dept: OBSTETRICS AND GYNECOLOGY | Facility: CLINIC | Age: 53
End: 2023-05-22
Payer: COMMERCIAL

## 2023-07-05 ENCOUNTER — OFFICE VISIT (OUTPATIENT)
Dept: OTOLARYNGOLOGY | Facility: CLINIC | Age: 53
End: 2023-07-05
Payer: COMMERCIAL

## 2023-07-05 VITALS
DIASTOLIC BLOOD PRESSURE: 88 MMHG | HEART RATE: 79 BPM | BODY MASS INDEX: 35.63 KG/M2 | HEIGHT: 67 IN | WEIGHT: 227 LBS | SYSTOLIC BLOOD PRESSURE: 135 MMHG

## 2023-07-05 DIAGNOSIS — J34.3 HYPERTROPHY OF INFERIOR NASAL TURBINATE: ICD-10-CM

## 2023-07-05 DIAGNOSIS — G47.33 OSA ON CPAP: ICD-10-CM

## 2023-07-05 DIAGNOSIS — J30.89 NON-SEASONAL ALLERGIC RHINITIS, UNSPECIFIED TRIGGER: Primary | ICD-10-CM

## 2023-07-05 PROCEDURE — 3075F PR MOST RECENT SYSTOLIC BLOOD PRESS GE 130-139MM HG: ICD-10-PCS | Mod: CPTII,S$GLB,, | Performed by: OTOLARYNGOLOGY

## 2023-07-05 PROCEDURE — 3079F PR MOST RECENT DIASTOLIC BLOOD PRESSURE 80-89 MM HG: ICD-10-PCS | Mod: CPTII,S$GLB,, | Performed by: OTOLARYNGOLOGY

## 2023-07-05 PROCEDURE — 4010F PR ACE/ARB THEARPY RXD/TAKEN: ICD-10-PCS | Mod: CPTII,S$GLB,, | Performed by: OTOLARYNGOLOGY

## 2023-07-05 PROCEDURE — 4010F ACE/ARB THERAPY RXD/TAKEN: CPT | Mod: CPTII,S$GLB,, | Performed by: OTOLARYNGOLOGY

## 2023-07-05 PROCEDURE — 1159F PR MEDICATION LIST DOCUMENTED IN MEDICAL RECORD: ICD-10-PCS | Mod: CPTII,S$GLB,, | Performed by: OTOLARYNGOLOGY

## 2023-07-05 PROCEDURE — 99203 OFFICE O/P NEW LOW 30 MIN: CPT | Mod: S$GLB,,, | Performed by: OTOLARYNGOLOGY

## 2023-07-05 PROCEDURE — 3008F BODY MASS INDEX DOCD: CPT | Mod: CPTII,S$GLB,, | Performed by: OTOLARYNGOLOGY

## 2023-07-05 PROCEDURE — 3008F PR BODY MASS INDEX (BMI) DOCUMENTED: ICD-10-PCS | Mod: CPTII,S$GLB,, | Performed by: OTOLARYNGOLOGY

## 2023-07-05 PROCEDURE — 3079F DIAST BP 80-89 MM HG: CPT | Mod: CPTII,S$GLB,, | Performed by: OTOLARYNGOLOGY

## 2023-07-05 PROCEDURE — 1159F MED LIST DOCD IN RCRD: CPT | Mod: CPTII,S$GLB,, | Performed by: OTOLARYNGOLOGY

## 2023-07-05 PROCEDURE — 3075F SYST BP GE 130 - 139MM HG: CPT | Mod: CPTII,S$GLB,, | Performed by: OTOLARYNGOLOGY

## 2023-07-05 PROCEDURE — 99203 PR OFFICE/OUTPT VISIT, NEW, LEVL III, 30-44 MIN: ICD-10-PCS | Mod: S$GLB,,, | Performed by: OTOLARYNGOLOGY

## 2023-07-05 PROCEDURE — 1160F RVW MEDS BY RX/DR IN RCRD: CPT | Mod: CPTII,S$GLB,, | Performed by: OTOLARYNGOLOGY

## 2023-07-05 PROCEDURE — 1160F PR REVIEW ALL MEDS BY PRESCRIBER/CLIN PHARMACIST DOCUMENTED: ICD-10-PCS | Mod: CPTII,S$GLB,, | Performed by: OTOLARYNGOLOGY

## 2023-07-05 RX ORDER — AZELASTINE 1 MG/ML
1 SPRAY, METERED NASAL 2 TIMES DAILY
Qty: 30 ML | Refills: 11 | Status: SHIPPED | OUTPATIENT
Start: 2023-07-05 | End: 2027-02-08

## 2023-07-05 RX ORDER — FLUTICASONE PROPIONATE 50 MCG
1 SPRAY, SUSPENSION (ML) NASAL 2 TIMES DAILY
Qty: 16 G | Refills: 11 | Status: SHIPPED | OUTPATIENT
Start: 2023-07-05 | End: 2025-06-26

## 2023-07-05 NOTE — PROGRESS NOTES
OTOLARYNGOLOGY- FACIAL PLASTIC & RECONSTRUCTIVE SURGERY      Renetta Sotelo  [id number]    CC:  Chief Complaint   Patient presents with    Follow-up       HISTORY OF PRESENT ILLNESS: Renetta Sotelo  is a 52 y.o. female who was self-referred for nasal obstruction.  Renetta reports a several year history of difficulty breathing on left and right side. The obstruction is bilateral  The obstruction is constant.     There is not a history of nasal trauma.  There is not a history of previous nasal surgery.      There is a history of nasal allergies disease. She has not had allergy testing. There are no known allergens. She does not have a history of asthma.   She reports a diagnosis of obstructive sleep apnea.  She uses the nasal pillow CPAP mask.  Reports nasal congestion that interferes with this.        She is used intranasal decongestant spray in the past which significantly helps her symptoms.  She knows not to use this regularly.  She is been using nasal saline some improvement of symptoms.  She is not used any nasal allergy sprays.    Past Medical History  She has a past medical history of Fibroids, Hypercholesteremia, Hypertension, Menorrhagia, PCOS (polycystic ovarian syndrome), and Type 2 diabetes mellitus, without long-term current use of insulin.    Past Surgical History  She has a past surgical history that includes Anterior cruciate ligament repair; Endometrial ablation; Tubal ligation; Knee arthroscopy; and Total abdominal hysterectomy w/ bilateral salpingoophorectomy (11/2022).    Family History  Her family history includes Brain cancer in her maternal uncle; Cancer in her father and mother; Heart failure in her father and mother; Hypertension in her maternal grandfather, maternal grandmother, paternal grandfather, and paternal grandmother; Lung cancer in her father, maternal grandfather, and paternal grandfather; Lymphoma in her father and paternal grandmother; Prostate cancer in her father and paternal  uncle; Skin cancer in her father; Stroke in her mother; Thyroid disease in her mother.    Social History  She reports that she quit smoking about 8 years ago. Her smoking use included cigarettes. She started smoking about 14 years ago. She has a 1.25 pack-year smoking history. She has never used smokeless tobacco. She reports current alcohol use of about 2.0 standard drinks per week. She reports that she does not use drugs.    Allergies  She has No Known Allergies.    Medications  She has a current medication list which includes the following prescription(s): bupropion, enalapril, ozempic, rosuvastatin, azelastine, fluticasone propionate, metformin, ozempic, and valacyclovir.      Review of Systems     Constitutional: Negative for appetite change, chills, fatigue, fever and unexpected weight loss.      HENT: Negative for ear discharge, ear infection, ear pain, facial swelling, hearing loss, mouth sores, nosebleeds, postnasal drip, ringing in the ears, runny nose, sinus infection, sinus pressure, sore throat, stuffy nose, tonsil infection, dental problems, trouble swallowing and voice change.      Eyes:  Negative for change in eyesight, eye drainage, eye itching and photophobia.     Respiratory:  Positive for sleep apnea.      Cardiovascular:  Negative for chest pain, foot swelling, irregular heartbeat and swollen veins.     Gastrointestinal:  Negative for abdominal pain, acid reflux, constipation, diarrhea, heartburn and vomiting.     Genitourinary: Negative for difficulty urinating, sexual problems and frequent urination.     Musc: Negative for aching joints, aching muscles, back pain and neck pain.     Skin: Negative for rash.     Allergy: Positive for seasonal allergies.     Endocrine: Negative for cold intolerance and heat intolerance.      Neurological: Negative for dizziness, headaches, light-headedness, seizures and tremors.      Hematologic: Negative for bruises/bleeds easily and swollen glands.   "    Psychiatric: Negative for decreased concentration, depression, nervous/anxious and sleep disturbance.          PHYSICAL EXAM:  /88 (BP Location: Left arm, Patient Position: Sitting, BP Method: Medium (Automatic))   Pulse 79   Ht 5' 7" (1.702 m)   Wt 103 kg (227 lb)   BMI 35.55 kg/m²   General: Alert and oriented in no acute distress  Head and Face: Normocephaic, atruamatic  Neurological: Cranial nerves II-XII are grossly intact  Skin: Skin texture is medium thickness rated as Zeng II  Eyes:   EOMI, sclera clear, PERRL  Ears: Pinna are normal in shape and position  EACs clear bilaterally  TMs clear without SAVANNA or perforation bilaterally  Nose: Nasal skin is thick    On frontal view the dorsum is straight and the nose is proportional compared to the facial features.  The brow-tip aesthetic line is harmonious .  Nasal base width is proportional compared to intercanthal distance.    On base view the tip is trapezoidal in shape.  The caudal septum is midline     Anterior rhinoscopy reveals the septum is straight without perforation or synechiae.  The turbinates are moderately hypertrophic with edematous mucosa  bilaterally.  There are not pathological secretions present.     The external nasal valve is normal and patent  on the right and normal and patent  on the left without dynamic collapse  The internal nasal valve is normal and patent  on the right and normal and patent  on the left.       Oral cavity and Oropharynx: Mucous membranes moist without lesions present.  Tongue protrudes midline and palate elevates midline.  Neck: Supple without LAD.    Lungs:breathing comfortably  Psychiatric:  Mood and affect are normal       ASSESSMENT:   1. Non-seasonal allergic rhinitis, unspecified trigger    2. MICHELLE on CPAP    3. Hypertrophy of inferior nasal turbinate            PLAN:     I had a long discussion with the patient regarding her condition and the further workup and management options.  Recommend " saline followed by intranasal steroid and antihistamine spray twice a day.  Continue humidification in her CPAP.  She will contact me if her symptoms persist despite consistent use of the nasal sprays.  Discussed that this take several weeks to take maximum effect.  Discussed potential option for in office turbinate reduction should her symptoms persist despite maximal medical therapy.

## 2023-07-25 ENCOUNTER — PATIENT MESSAGE (OUTPATIENT)
Dept: OTOLARYNGOLOGY | Facility: CLINIC | Age: 53
End: 2023-07-25
Payer: COMMERCIAL

## 2023-12-12 ENCOUNTER — PATIENT MESSAGE (OUTPATIENT)
Dept: OBSTETRICS AND GYNECOLOGY | Facility: CLINIC | Age: 53
End: 2023-12-12
Payer: COMMERCIAL

## 2024-01-16 RX ORDER — TIRZEPATIDE 10 MG/.5ML
INJECTION, SOLUTION SUBCUTANEOUS
COMMUNITY
Start: 2024-01-15 | End: 2024-06-12

## 2024-01-17 ENCOUNTER — OFFICE VISIT (OUTPATIENT)
Dept: OBSTETRICS AND GYNECOLOGY | Facility: CLINIC | Age: 54
End: 2024-01-17
Payer: COMMERCIAL

## 2024-01-17 VITALS
DIASTOLIC BLOOD PRESSURE: 89 MMHG | BODY MASS INDEX: 37.09 KG/M2 | WEIGHT: 236.81 LBS | SYSTOLIC BLOOD PRESSURE: 123 MMHG

## 2024-01-17 DIAGNOSIS — Z12.31 BREAST CANCER SCREENING BY MAMMOGRAM: ICD-10-CM

## 2024-01-17 DIAGNOSIS — N95.1 MENOPAUSAL STATE: ICD-10-CM

## 2024-01-17 DIAGNOSIS — Z01.419 ENCOUNTER FOR WELL WOMAN EXAM WITH ROUTINE GYNECOLOGICAL EXAM: Primary | ICD-10-CM

## 2024-01-17 PROCEDURE — 3079F DIAST BP 80-89 MM HG: CPT | Mod: CPTII,S$GLB,, | Performed by: OBSTETRICS & GYNECOLOGY

## 2024-01-17 PROCEDURE — 1159F MED LIST DOCD IN RCRD: CPT | Mod: CPTII,S$GLB,, | Performed by: OBSTETRICS & GYNECOLOGY

## 2024-01-17 PROCEDURE — 3008F BODY MASS INDEX DOCD: CPT | Mod: CPTII,S$GLB,, | Performed by: OBSTETRICS & GYNECOLOGY

## 2024-01-17 PROCEDURE — 99396 PREV VISIT EST AGE 40-64: CPT | Mod: S$GLB,,, | Performed by: OBSTETRICS & GYNECOLOGY

## 2024-01-17 PROCEDURE — 3074F SYST BP LT 130 MM HG: CPT | Mod: CPTII,S$GLB,, | Performed by: OBSTETRICS & GYNECOLOGY

## 2024-01-17 NOTE — PROGRESS NOTES
CC:  WELL WOMAN (menop/ hyst with BSO)  Patient Care Team:  Ofelia Palomo MD as PCP - General (Family Medicine)  Denise Bailey MD (Gastroenterology)  Jermaine Cook MD (Cardiovascular Disease)    Last visit with me was on  1/10/2023 for post op    HPI:  Patient is a 53 y.o. who presents for her well woman exam today.  History reviewed with patient. 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     Her hyst was for bleeding and fibroids in   HRT:  never used  HX ABNL PAPS: none    REVIEW OF PRIOR DATA/ HEALTH MAINTENANCE:  LAST ANNUAL:   2022    LAST MMG (screening)- 2023-  [facility]    LAST LABS- does with PCP   LAST COLONOSCOPY- - by Dr. Bailey  -q 5 yrs for polyps   LAST DEXA- never    Past Medical History:   Diagnosis Date    Hypercholesteremia     Hypertension     Type 2 diabetes mellitus, without long-term current use of insulin 2020     SURGICAL HX:   has a past surgical history that includes Anterior cruciate ligament repair; Endometrial ablation; Tubal ligation; Knee arthroscopy; and Total abdominal hysterectomy w/ bilateral salpingoophorectomy (2022).    SOCIAL HX:    reports that she quit smoking about 9 years ago. Her smoking use included cigarettes. She started smoking about 14 years ago. She has a 1.4 pack-year smoking history. She has never used smokeless tobacco. She reports current alcohol use of about 2.0 standard drinks of alcohol per week. She reports that she does not use drugs.    FAMILY HX:   family history includes Brain cancer in her maternal uncle; Cancer in her father and mother; Heart failure in her father and mother; Hypertension in her maternal grandfather, maternal grandmother, paternal grandfather, and paternal grandmother; Lung cancer in her father, maternal grandfather, and paternal grandfather; Lymphoma in her  father and paternal grandmother; Prostate cancer in her father and paternal uncle; Skin cancer in her father; Stroke in her mother; Thyroid disease in her mother. .    ALLERGIES:  Patient has no known allergies.    Current Outpatient Medications   Medication Instructions    azelastine (ASTELIN) 137 mcg, Nasal, 2 times daily    buPROPion (WELLBUTRIN XL) 300 mg, Oral, Daily    enalapril (VASOTEC) 10 mg, Oral, 2 times daily    fluticasone propionate (FLONASE) 50 mcg, Each Nostril, 2 times daily    metFORMIN (GLUCOPHAGE-XR) 500 mg, Oral, 2 times daily with meals    MOUNJARO 10 mg/0.5 mL PnIj     rosuvastatin (CRESTOR) 10 mg, Oral, Nightly     ROS:  CONST:  No fever, chills, fatigue or unexpected changes in weight   CV: No chest pain or palpitations   RESP:  No shortness of breath or cough   GI: No abd pain, vomiting, diarrhea, blood in stool, or changes in bowel mvmts   SKIN: No rashes or lesions  MUSCULOSKELETAL: No joint swelling or pain   PSYCH: No changes in mood or insomia   BREASTS: No asymmetry, lumps, pain, nipple discharge, or skin changes   :  No dysuria, urgency, frequency, hematuria or incontinence           No vag dc, itching, odor or dryness           No pelvic pain, dyspareunia, or abnormal vaginal bleeding     VITALS:  Blood pressure 123/89, weight 107.4 kg (236 lb 12.8 oz), last menstrual period 09/05/2022.  Body mass index is 37.09 kg/m².     PHYSICAL EXAM-  APPEARANCE: Well appearing, in no acute distress.   NECK: Neck symmetric   CV:  Normal rate   PULM: Normal resp rate, no resp distress, normal resp effort   PSYCH:  Normal mood and affect, cooperative   SKIN: No rashes, lesions, or abnormal bruising   LYMPH: No inguinal or axillary adenopathy   ABD: Soft, without tenderness or masses.    BREAST: Symmetrical, no nipple changes, no skin changes, No palpable masses   PELVIC:  VULVA: Normal female genitalia. No lesions.   URETHRAL MEATUS: No masses, no significant prolapse.  BLADDER/ URETHRA: No  masses or suprapubic tenderness   VAGINA/ CUFF: No lesions. +atrophic changes. No discharge   PELVIS: No masses, tenderness, or fullness on bimanual exam   ANUS/ PERINEUM: Normal tone.  No lesions.     *female chaperone present for entire exam    ASSESSMENT and PLAN:  Encounter for well woman exam with routine gynecological exam  -     Liquid-based pap smear, screening    Breast cancer screening by mammogram  -     Mammo Digital Screening Bilat w/ Azeem; Future; Expected date: 01/30/2024    Menopausal state       FOLLOWUP:  1 year for wwe or sooner prn    COUNSELING:  Patient was counseled today on recommendations for yearly pelvic exam, current Pap guidelines, self breast exams, annual screening mammograms, routine screening colonoscopy, and screening bone density. Reviewed calcium and vitamin D supplements and weight bearing exercise to minimize risks.  Encouraged patient to see her PCP for other health maintenance.

## 2024-01-24 LAB — Lab: NORMAL

## 2024-03-26 ENCOUNTER — PATIENT MESSAGE (OUTPATIENT)
Dept: OBSTETRICS AND GYNECOLOGY | Facility: CLINIC | Age: 54
End: 2024-03-26
Payer: COMMERCIAL

## 2024-04-03 DIAGNOSIS — R92.8 ABNORMAL FINDING ON MAMMOGRAPHY: Primary | ICD-10-CM

## 2024-04-03 NOTE — PROGRESS NOTES
Please let pt know they are requesting some additional views of her right breast -I sent the orders so she can call and schedule that soon

## 2024-04-04 ENCOUNTER — TELEPHONE (OUTPATIENT)
Dept: OBSTETRICS AND GYNECOLOGY | Facility: CLINIC | Age: 54
End: 2024-04-04
Payer: COMMERCIAL

## 2024-04-04 NOTE — TELEPHONE ENCOUNTER
Attempted to return call to patient, no answer. Left patient voice mail to return call to clinic. Advised patient to call centralized scheduling to schedule the appointment, as the order was not faxed directly to [facility]

## 2024-04-04 NOTE — TELEPHONE ENCOUNTER
----- Message from Malathi Fernandez sent at 4/4/2024  1:29 PM CDT -----  Regarding: Orders  Contact: patient  Per phone call with patient, she stated that [facility] has not received the orders for the test  [facility] to be done.  Please return call at [phone].          Thanks,  SJ

## 2024-05-07 DIAGNOSIS — R92.8 ABNORMAL SCREENING MAMMOGRAM: Primary | ICD-10-CM

## 2024-05-07 NOTE — PROGRESS NOTES
"Please call pt and let her know her addtl views and ultrasound showed what they suspect is a "probably benign" fibroadenoma or lymph node within the breast. They recommend repating mmg/ us in 6 months but I am ref her to Dr Hoover who is a breast specialist as well to get her opinion. If she thinks it is a fibroadenoma those are benign but sometimes grow and so removing it would be an option. Please fax rf and her reports to loki"

## 2024-05-30 ENCOUNTER — DOCUMENTATION ONLY (OUTPATIENT)
Dept: OBSTETRICS AND GYNECOLOGY | Facility: CLINIC | Age: 54
End: 2024-05-30
Payer: COMMERCIAL

## 2024-06-12 ENCOUNTER — OFFICE VISIT (OUTPATIENT)
Dept: PRIMARY CARE CLINIC | Facility: CLINIC | Age: 54
End: 2024-06-12
Payer: COMMERCIAL

## 2024-06-12 VITALS
DIASTOLIC BLOOD PRESSURE: 70 MMHG | TEMPERATURE: 98 F | HEIGHT: 67 IN | RESPIRATION RATE: 16 BRPM | OXYGEN SATURATION: 99 % | SYSTOLIC BLOOD PRESSURE: 110 MMHG | HEART RATE: 79 BPM | BODY MASS INDEX: 36.26 KG/M2 | WEIGHT: 231 LBS

## 2024-06-12 DIAGNOSIS — Z00.00 PREVENTATIVE HEALTH CARE: Primary | ICD-10-CM

## 2024-06-12 DIAGNOSIS — I10 ESSENTIAL HYPERTENSION: ICD-10-CM

## 2024-06-12 DIAGNOSIS — E11.69 TYPE 2 DIABETES MELLITUS WITH OTHER SPECIFIED COMPLICATION, WITHOUT LONG-TERM CURRENT USE OF INSULIN: ICD-10-CM

## 2024-06-12 DIAGNOSIS — G47.33 OSA (OBSTRUCTIVE SLEEP APNEA): ICD-10-CM

## 2024-06-12 DIAGNOSIS — E78.00 PURE HYPERCHOLESTEROLEMIA: ICD-10-CM

## 2024-06-12 PROCEDURE — 3074F SYST BP LT 130 MM HG: CPT | Mod: CPTII,S$GLB,, | Performed by: FAMILY MEDICINE

## 2024-06-12 PROCEDURE — 99396 PREV VISIT EST AGE 40-64: CPT | Mod: S$GLB,,, | Performed by: FAMILY MEDICINE

## 2024-06-12 PROCEDURE — 4010F ACE/ARB THERAPY RXD/TAKEN: CPT | Mod: CPTII,S$GLB,, | Performed by: FAMILY MEDICINE

## 2024-06-12 PROCEDURE — 1159F MED LIST DOCD IN RCRD: CPT | Mod: CPTII,S$GLB,, | Performed by: FAMILY MEDICINE

## 2024-06-12 PROCEDURE — 3078F DIAST BP <80 MM HG: CPT | Mod: CPTII,S$GLB,, | Performed by: FAMILY MEDICINE

## 2024-06-12 PROCEDURE — 1160F RVW MEDS BY RX/DR IN RCRD: CPT | Mod: CPTII,S$GLB,, | Performed by: FAMILY MEDICINE

## 2024-06-12 PROCEDURE — 3008F BODY MASS INDEX DOCD: CPT | Mod: CPTII,S$GLB,, | Performed by: FAMILY MEDICINE

## 2024-06-12 RX ORDER — SEMAGLUTIDE 2.68 MG/ML
2 INJECTION, SOLUTION SUBCUTANEOUS
COMMUNITY
Start: 2024-05-29

## 2024-06-12 NOTE — PROGRESS NOTES
Subjective     Patient ID: Renetta Sotelo is a 53 y.o. female.    Chief Complaint: Establish Care (Patient would like lab work done )    HPI    Here to est care  Hx DM, HLD, HTN  Htn is well controlled on current  DM - due for A1c, on ozempic and metformin  HLD - on statin  Mmg complete, seeing breast surg this week  Due for c-scope this year    Review of Systems   Constitutional:  Negative for activity change, chills, diaphoresis, fatigue, fever and unexpected weight change.   HENT:  Negative for nasal congestion, hearing loss, rhinorrhea, sinus pressure/congestion, sore throat, trouble swallowing and voice change.    Eyes:  Negative for pain, discharge and visual disturbance.   Respiratory:  Negative for cough, chest tightness, shortness of breath and wheezing.    Cardiovascular:  Negative for chest pain, palpitations and leg swelling.   Gastrointestinal:  Negative for abdominal pain, blood in stool, constipation, diarrhea, nausea and vomiting.   Endocrine: Negative for polydipsia and polyuria.   Genitourinary:  Negative for decreased urine volume, difficulty urinating, dysuria, flank pain, frequency, hematuria, menstrual problem, pelvic pain, vaginal bleeding and vaginal discharge.   Musculoskeletal:  Negative for arthralgias, back pain, joint swelling, myalgias and neck pain.   Integumentary:  Negative for color change, pallor and rash.   Neurological:  Negative for dizziness, syncope, weakness, light-headedness and headaches.   Hematological:  Negative for adenopathy.   Psychiatric/Behavioral:  Negative for confusion, decreased concentration, dysphoric mood and sleep disturbance. The patient is not nervous/anxious.           Objective     Physical Exam  Vitals reviewed.   Constitutional:       General: She is not in acute distress.     Appearance: She is well-developed. She is not diaphoretic.   HENT:      Head: Normocephalic and atraumatic.      Nose: Nose normal.      Mouth/Throat:      Mouth: Mucous  membranes are moist.      Pharynx: Oropharynx is clear.   Eyes:      Conjunctiva/sclera: Conjunctivae normal.      Pupils: Pupils are equal, round, and reactive to light.   Neck:      Thyroid: No thyromegaly.      Vascular: No JVD.   Cardiovascular:      Rate and Rhythm: Normal rate and regular rhythm.      Pulses: Normal pulses.      Heart sounds: Normal heart sounds. No murmur heard.     No friction rub. No gallop.   Pulmonary:      Effort: Pulmonary effort is normal. No respiratory distress.      Breath sounds: Normal breath sounds. No stridor. No wheezing or rales.   Abdominal:      General: Bowel sounds are normal. There is no distension.      Palpations: Abdomen is soft.      Tenderness: There is no abdominal tenderness.   Musculoskeletal:         General: No swelling or tenderness.      Cervical back: Normal range of motion and neck supple.      Right lower leg: No edema.      Left lower leg: No edema.   Lymphadenopathy:      Cervical: No cervical adenopathy.   Skin:     General: Skin is warm and dry.      Coloration: Skin is not pale.      Findings: No erythema or rash.   Neurological:      Mental Status: She is alert and oriented to person, place, and time.   Psychiatric:         Mood and Affect: Mood normal.         Behavior: Behavior normal.            Assessment and Plan     1. Preventative health care  -     CBC Auto Differential; Future; Expected date: 06/12/2024  -     Comprehensive Metabolic Panel; Future; Expected date: 06/12/2024  -     TSH; Future; Expected date: 06/12/2024  -     Lipid Panel; Future; Expected date: 06/12/2024  -     Hemoglobin A1C; Future; Expected date: 06/12/2024  -     T4, Free; Future; Expected date: 06/12/2024    2. Essential hypertension  Comments:  controlled on current    3. Type 2 diabetes mellitus with other specified complication, without long-term current use of insulin  Comments:  a1c pending    4. Pure hypercholesterolemia  Comments:  on statin    5. MICHELLE  (obstructive sleep apnea)  Comments:  on cpap               No follow-ups on file.

## 2024-06-13 ENCOUNTER — PATIENT MESSAGE (OUTPATIENT)
Dept: PRIMARY CARE CLINIC | Facility: CLINIC | Age: 54
End: 2024-06-13
Payer: COMMERCIAL

## 2024-06-13 LAB
ABS NRBC COUNT: 0 X 10 3/UL (ref 0–0.01)
ABSOLUTE BASOPHIL: 0.05 X 10 3/UL (ref 0–0.22)
ABSOLUTE EOSINOPHIL: 0.16 X 10 3/UL (ref 0.04–0.54)
ABSOLUTE IMMATURE GRAN: 0.03 X 10 3/UL (ref 0–0.04)
ABSOLUTE LYMPHOCYTE: 2.34 X 10 3/UL (ref 0.86–4.75)
ABSOLUTE MONOCYTE: 0.43 X 10 3/UL (ref 0.22–1.08)
ALBUMIN SERPL-MCNC: 4.3 G/DL (ref 3.5–5.2)
ALBUMIN/GLOB SERPL ELPH: 1.8 {RATIO} (ref 1–2.7)
ALP ISOS SERPL LEV INH-CCNC: 84 U/L (ref 35–105)
ALT (SGPT): 41 U/L (ref 0–33)
ANION GAP SERPL CALC-SCNC: 13 MMOL/L (ref 8–17)
AST SERPL-CCNC: 25 U/L (ref 0–32)
BASOPHILS NFR BLD: 0.7 % (ref 0.2–1.2)
BILIRUBIN, TOTAL: 0.28 MG/DL (ref 0–1.2)
BUN/CREAT SERPL: 12 (ref 6–20)
CALCIUM SERPL-MCNC: 9.5 MG/DL (ref 8.6–10.2)
CARBON DIOXIDE, CO2: 25 MMOL/L (ref 22–29)
CHLORIDE: 103 MMOL/L (ref 98–107)
CHOLEST SERPL-MSCNC: 96 MG/DL (ref 100–200)
CREAT SERPL-MCNC: 0.8 MG/DL (ref 0.5–0.9)
EOSINOPHIL NFR BLD: 2.4 % (ref 0.7–7)
ESTIMATED AVERAGE GLUCOSE: 110 MG/DL
GFR ESTIMATION: 87.5 ML/MIN/1.73M2
GLOBULIN: 2.4 G/DL (ref 1.5–4.5)
GLUCOSE: 93 MG/DL (ref 74–106)
HBA1C MFR BLD: 5.5 % (ref 4–6)
HCT VFR BLD AUTO: 43.1 % (ref 37–47)
HDLC SERPL-MCNC: 51 MG/DL
HGB BLD-MCNC: 14.1 G/DL (ref 12–16)
IMMATURE GRANULOCYTES: 0.4 % (ref 0–0.5)
LDL/HDL RATIO: 0.6 (ref 1–3)
LDLC SERPL CALC-MCNC: 30.4 MG/DL (ref 0–100)
LYMPHOCYTES NFR BLD: 34.6 % (ref 19.3–53.1)
MCH RBC QN AUTO: 30.3 PG (ref 27–32)
MCHC RBC AUTO-ENTMCNC: 32.7 G/DL (ref 32–36)
MCV RBC AUTO: 92.5 FL (ref 82–100)
MONOCYTES NFR BLD: 6.4 % (ref 4.7–12.5)
NEUTROPHILS # BLD AUTO: 3.75 X 10 3/UL (ref 2.15–7.56)
NEUTROPHILS NFR BLD: 55.5 % (ref 34–71.1)
NUCLEATED RED BLOOD CELLS: 0 /100 WBC (ref 0–0.2)
PLATELET # BLD AUTO: 305 X 10 3/UL (ref 135–400)
POTASSIUM: 4.2 MMOL/L (ref 3.5–5.1)
PROT SNV-MCNC: 6.7 G/DL (ref 6.4–8.3)
RBC # BLD AUTO: 4.66 X 10 6/UL (ref 4.2–5.4)
RDW-SD: 44.2 FL (ref 37–54)
SODIUM: 141 MMOL/L (ref 136–145)
T4, FREE: 1.34 NG/DL (ref 0.93–1.7)
TRIGL SERPL-MCNC: 73 MG/DL (ref 0–150)
TSH SERPL DL<=0.005 MIU/L-ACNC: 1.81 UIU/ML (ref 0.27–4.2)
UREA NITROGEN (BUN): 9.6 MG/DL (ref 6–20)
WBC # BLD: 6.76 X 10 3/UL (ref 4.3–10.8)

## 2024-06-17 DIAGNOSIS — K21.9 GASTROESOPHAGEAL REFLUX DISEASE, UNSPECIFIED WHETHER ESOPHAGITIS PRESENT: Primary | ICD-10-CM

## 2024-06-17 RX ORDER — DEXLANSOPRAZOLE 60 MG/1
60 CAPSULE, DELAYED RELEASE ORAL DAILY
Qty: 90 CAPSULE | Refills: 3 | Status: SHIPPED | OUTPATIENT
Start: 2024-06-17 | End: 2025-06-17

## 2024-07-19 ENCOUNTER — PATIENT MESSAGE (OUTPATIENT)
Dept: PRIMARY CARE CLINIC | Facility: CLINIC | Age: 54
End: 2024-07-19
Payer: COMMERCIAL

## 2024-08-01 ENCOUNTER — PATIENT MESSAGE (OUTPATIENT)
Dept: PRIMARY CARE CLINIC | Facility: CLINIC | Age: 54
End: 2024-08-01
Payer: COMMERCIAL

## 2024-08-05 ENCOUNTER — TELEPHONE (OUTPATIENT)
Dept: FAMILY MEDICINE | Facility: CLINIC | Age: 54
End: 2024-08-05
Payer: COMMERCIAL

## 2024-08-05 DIAGNOSIS — E88.819 INSULIN RESISTANCE: Primary | ICD-10-CM

## 2024-08-05 DIAGNOSIS — E78.5 HYPERLIPIDEMIA, UNSPECIFIED HYPERLIPIDEMIA TYPE: ICD-10-CM

## 2024-08-05 DIAGNOSIS — K21.9 GASTROESOPHAGEAL REFLUX DISEASE, UNSPECIFIED WHETHER ESOPHAGITIS PRESENT: ICD-10-CM

## 2024-08-05 DIAGNOSIS — E11.69 TYPE 2 DIABETES MELLITUS WITH OTHER SPECIFIED COMPLICATION, WITHOUT LONG-TERM CURRENT USE OF INSULIN: Primary | ICD-10-CM

## 2024-08-05 DIAGNOSIS — I10 ESSENTIAL HYPERTENSION: ICD-10-CM

## 2024-08-05 DIAGNOSIS — E28.2 PCOS (POLYCYSTIC OVARIAN SYNDROME): ICD-10-CM

## 2024-08-05 RX ORDER — DEXLANSOPRAZOLE 60 MG/1
60 CAPSULE, DELAYED RELEASE ORAL DAILY
Qty: 90 CAPSULE | Refills: 3 | Status: SHIPPED | OUTPATIENT
Start: 2024-08-05 | End: 2025-08-05

## 2024-08-05 RX ORDER — ENALAPRIL MALEATE 10 MG/1
10 TABLET ORAL 2 TIMES DAILY
Qty: 180 TABLET | Refills: 0 | Status: SHIPPED | OUTPATIENT
Start: 2024-08-05

## 2024-08-05 RX ORDER — TIRZEPATIDE 5 MG/.5ML
5 INJECTION, SOLUTION SUBCUTANEOUS
Qty: 2 ML | Refills: 2 | Status: SHIPPED | OUTPATIENT
Start: 2024-08-05

## 2024-08-05 RX ORDER — SEMAGLUTIDE 2.68 MG/ML
2 INJECTION, SOLUTION SUBCUTANEOUS
Qty: 3 ML | Refills: 11 | Status: SHIPPED | OUTPATIENT
Start: 2024-08-05 | End: 2024-08-05

## 2024-08-05 RX ORDER — ROSUVASTATIN CALCIUM 5 MG/1
5 TABLET, COATED ORAL NIGHTLY
Qty: 90 TABLET | Refills: 3 | Status: SHIPPED | OUTPATIENT
Start: 2024-08-05

## 2024-08-05 RX ORDER — METFORMIN HYDROCHLORIDE 500 MG/1
500 TABLET, EXTENDED RELEASE ORAL 2 TIMES DAILY WITH MEALS
Qty: 180 TABLET | Refills: 3 | Status: SHIPPED | OUTPATIENT
Start: 2024-08-05 | End: 2025-08-05

## 2024-08-20 ENCOUNTER — TELEPHONE (OUTPATIENT)
Dept: GASTROENTEROLOGY | Facility: CLINIC | Age: 54
End: 2024-08-20
Payer: COMMERCIAL

## 2024-08-20 VITALS — HEIGHT: 67 IN | WEIGHT: 230 LBS | BODY MASS INDEX: 36.1 KG/M2

## 2024-08-20 DIAGNOSIS — Z12.11 SCREENING FOR COLON CANCER: ICD-10-CM

## 2024-08-20 DIAGNOSIS — Z86.010 HISTORY OF COLON POLYPS: Primary | ICD-10-CM

## 2024-08-20 NOTE — TELEPHONE ENCOUNTER
----- Message from Malathi Fernandez sent at 8/19/2024 11:50 AM CDT -----  Regarding: Appointment  Contact: patient  Per phone call with patient, she stated that she received a letter stating that it is time for her colonoscopy to be done.  The caller indicated that she met her deductible for this year and she wants to know how much does she has to pay out of pocket before she schedule the appointment.  Please return call at [phone].        Thanks,  SJ

## 2024-08-20 NOTE — TELEPHONE ENCOUNTER
Patient's last colonoscopy was w/ NBP on 3/11/2021. Polyp (12 mm) in the cecum. (Polypectomy). Polyp (4 mm) in the rectum. (Polypectomy). Polyp (5 mm) in the transverse colon. (Polypectomy). Internal and external hemorrhoids. Normal mucosa in the terminal ileum. The colon is otherwise normal. Per NBP's report.    Patient denied having any current GI problems or issues. Also denied having any hx of seizures or kidney disease. Patient has sleep apnea and uses a CPAP machine at night. Settings are unknown. Family history of UC in mother. Patient was notified to hold Mounjaro 1 week prior to procedure. Patient voiced understanding. Chart was reviewed and updated with patient. Prep instructions were also reviewed and sent to patient's email at [email or web address].    Patient was added to waiting list per her request. She met her deductible for the year and would like to try to have procedure this year.    Colonoscopy scheduled on Thursday January 30, 2025 w/ [facility] at [facility]. DMP

## 2024-08-23 RX ORDER — SOD SULF/POT CHLORIDE/MAG SULF 1.479 G
TABLET ORAL
Qty: 24 TABLET | Refills: 0 | Status: SHIPPED | OUTPATIENT
Start: 2024-08-23

## 2024-09-09 ENCOUNTER — PATIENT MESSAGE (OUTPATIENT)
Dept: PRIMARY CARE CLINIC | Facility: CLINIC | Age: 54
End: 2024-09-09
Payer: COMMERCIAL

## 2024-09-27 ENCOUNTER — PATIENT MESSAGE (OUTPATIENT)
Dept: PRIMARY CARE CLINIC | Facility: CLINIC | Age: 54
End: 2024-09-27
Payer: COMMERCIAL

## 2024-09-27 DIAGNOSIS — Z12.11 SCREENING FOR COLON CANCER: Primary | ICD-10-CM

## 2024-10-08 ENCOUNTER — OFFICE VISIT (OUTPATIENT)
Dept: PRIMARY CARE CLINIC | Facility: CLINIC | Age: 54
End: 2024-10-08
Payer: COMMERCIAL

## 2024-10-08 VITALS
RESPIRATION RATE: 18 BRPM | DIASTOLIC BLOOD PRESSURE: 84 MMHG | SYSTOLIC BLOOD PRESSURE: 126 MMHG | HEART RATE: 82 BPM | OXYGEN SATURATION: 99 % | BODY MASS INDEX: 37.51 KG/M2 | WEIGHT: 239 LBS | HEIGHT: 67 IN

## 2024-10-08 DIAGNOSIS — E11.69 TYPE 2 DIABETES MELLITUS WITH OTHER SPECIFIED COMPLICATION, WITHOUT LONG-TERM CURRENT USE OF INSULIN: Primary | ICD-10-CM

## 2024-10-08 DIAGNOSIS — M19.90 ARTHRITIS: ICD-10-CM

## 2024-10-08 DIAGNOSIS — F41.1 GENERALIZED ANXIETY DISORDER: ICD-10-CM

## 2024-10-08 PROCEDURE — 99214 OFFICE O/P EST MOD 30 MIN: CPT | Mod: S$GLB,,, | Performed by: FAMILY MEDICINE

## 2024-10-08 PROCEDURE — 3074F SYST BP LT 130 MM HG: CPT | Mod: CPTII,S$GLB,, | Performed by: FAMILY MEDICINE

## 2024-10-08 PROCEDURE — 3044F HG A1C LEVEL LT 7.0%: CPT | Mod: CPTII,S$GLB,, | Performed by: FAMILY MEDICINE

## 2024-10-08 PROCEDURE — 1159F MED LIST DOCD IN RCRD: CPT | Mod: CPTII,S$GLB,, | Performed by: FAMILY MEDICINE

## 2024-10-08 PROCEDURE — 3079F DIAST BP 80-89 MM HG: CPT | Mod: CPTII,S$GLB,, | Performed by: FAMILY MEDICINE

## 2024-10-08 PROCEDURE — 3008F BODY MASS INDEX DOCD: CPT | Mod: CPTII,S$GLB,, | Performed by: FAMILY MEDICINE

## 2024-10-08 PROCEDURE — 1160F RVW MEDS BY RX/DR IN RCRD: CPT | Mod: CPTII,S$GLB,, | Performed by: FAMILY MEDICINE

## 2024-10-08 PROCEDURE — 4010F ACE/ARB THERAPY RXD/TAKEN: CPT | Mod: CPTII,S$GLB,, | Performed by: FAMILY MEDICINE

## 2024-10-08 RX ORDER — MELOXICAM 15 MG/1
15 TABLET ORAL DAILY
Qty: 90 TABLET | Refills: 1 | Status: SHIPPED | OUTPATIENT
Start: 2024-10-08

## 2024-10-08 RX ORDER — BUPROPION HYDROCHLORIDE 150 MG/1
150 TABLET ORAL DAILY
Qty: 90 TABLET | Refills: 3 | Status: SHIPPED | OUTPATIENT
Start: 2024-10-08 | End: 2025-10-08

## 2024-10-10 NOTE — PROGRESS NOTES
Subjective     Patient ID: Renetta Sotelo is a 54 y.o. female.    Chief Complaint: Follow-up    HPI    F/u chronic  She was changed from ozempic 2 mg to mounjaro 5 mg and notes increased appetite and less control of BG  Agreeable to increase dose today  She is doing well on wellbutrin and prn mobic      Review of Systems   Constitutional:  Negative for chills, diaphoresis, fatigue, fever and unexpected weight change.   HENT:  Negative for nasal congestion, hearing loss, rhinorrhea, sinus pressure/congestion, sore throat, trouble swallowing and voice change.    Eyes:  Negative for pain and visual disturbance.   Respiratory:  Negative for cough, chest tightness, shortness of breath and wheezing.    Cardiovascular:  Negative for chest pain, palpitations and leg swelling.   Gastrointestinal:  Negative for abdominal pain, constipation, diarrhea, nausea and vomiting.   Genitourinary:  Negative for decreased urine volume, dysuria, flank pain, frequency, hematuria, pelvic pain, vaginal bleeding and vaginal discharge.   Musculoskeletal:  Negative for arthralgias, back pain, myalgias and neck pain.   Integumentary:  Negative for color change, pallor and rash.   Neurological:  Negative for dizziness, syncope, weakness, light-headedness and headaches.   Hematological:  Negative for adenopathy.   Psychiatric/Behavioral:  Negative for decreased concentration, dysphoric mood and sleep disturbance. The patient is not nervous/anxious.           Objective     Physical Exam  Vitals reviewed.   Constitutional:       General: She is not in acute distress.     Appearance: She is well-developed. She is not diaphoretic.   HENT:      Head: Normocephalic and atraumatic.      Nose: Nose normal.      Mouth/Throat:      Mouth: Mucous membranes are moist.      Pharynx: Oropharynx is clear.   Eyes:      Conjunctiva/sclera: Conjunctivae normal.      Pupils: Pupils are equal, round, and reactive to light.   Neck:      Thyroid: No thyromegaly.       Vascular: No JVD.   Cardiovascular:      Rate and Rhythm: Normal rate and regular rhythm.      Pulses: Normal pulses.      Heart sounds: Normal heart sounds. No murmur heard.     No friction rub. No gallop.   Pulmonary:      Effort: Pulmonary effort is normal. No respiratory distress.      Breath sounds: Normal breath sounds. No stridor. No wheezing or rales.   Abdominal:      General: Bowel sounds are normal. There is no distension.      Palpations: Abdomen is soft.      Tenderness: There is no abdominal tenderness.   Musculoskeletal:         General: No swelling or tenderness.      Cervical back: Normal range of motion and neck supple.      Right lower leg: No edema.      Left lower leg: No edema.   Lymphadenopathy:      Cervical: No cervical adenopathy.   Skin:     General: Skin is warm and dry.      Coloration: Skin is not pale.      Findings: No erythema or rash.   Neurological:      Mental Status: She is alert and oriented to person, place, and time.   Psychiatric:         Mood and Affect: Mood normal.         Behavior: Behavior normal.            Assessment and Plan     1. Type 2 diabetes mellitus with other specified complication, without long-term current use of insulin  -     tirzepatide 7.5 mg/0.5 mL PnIj; Inject 7.5 mg into the skin every 7 days.  Dispense: 2 mL; Refill: 3    2. Generalized anxiety disorder  -     buPROPion (WELLBUTRIN XL) 150 MG TB24 tablet; Take 1 tablet (150 mg total) by mouth once daily.  Dispense: 90 tablet; Refill: 3    3. Arthritis  -     meloxicam (MOBIC) 15 MG tablet; Take 1 tablet (15 mg total) by mouth once daily.  Dispense: 90 tablet; Refill: 1                 No follow-ups on file.

## 2024-10-13 DIAGNOSIS — I10 ESSENTIAL HYPERTENSION: ICD-10-CM

## 2024-10-14 DIAGNOSIS — I10 ESSENTIAL HYPERTENSION: ICD-10-CM

## 2024-10-14 RX ORDER — ENALAPRIL MALEATE 10 MG/1
10 TABLET ORAL 2 TIMES DAILY
Qty: 180 TABLET | Refills: 0 | Status: SHIPPED | OUTPATIENT
Start: 2024-10-14

## 2024-10-14 RX ORDER — ENALAPRIL MALEATE 10 MG/1
10 TABLET ORAL 2 TIMES DAILY
Qty: 180 TABLET | Refills: 0 | Status: SHIPPED | OUTPATIENT
Start: 2024-10-14 | End: 2024-10-14 | Stop reason: SDUPTHER

## 2024-10-28 ENCOUNTER — PATIENT MESSAGE (OUTPATIENT)
Dept: ADMINISTRATIVE | Facility: HOSPITAL | Age: 54
End: 2024-10-28
Payer: COMMERCIAL

## 2024-10-28 ENCOUNTER — PATIENT MESSAGE (OUTPATIENT)
Dept: PRIMARY CARE CLINIC | Facility: CLINIC | Age: 54
End: 2024-10-28
Payer: COMMERCIAL

## 2024-10-28 DIAGNOSIS — E11.69 TYPE 2 DIABETES MELLITUS WITH OTHER SPECIFIED COMPLICATION, WITHOUT LONG-TERM CURRENT USE OF INSULIN: ICD-10-CM

## 2024-10-29 DIAGNOSIS — E11.69 TYPE 2 DIABETES MELLITUS WITH OTHER SPECIFIED COMPLICATION, WITHOUT LONG-TERM CURRENT USE OF INSULIN: Primary | ICD-10-CM

## 2024-11-01 LAB
LEFT EYE DM RETINOPATHY: NEGATIVE
RIGHT EYE DM RETINOPATHY: NEGATIVE

## 2024-11-05 DIAGNOSIS — K21.9 GASTROESOPHAGEAL REFLUX DISEASE, UNSPECIFIED WHETHER ESOPHAGITIS PRESENT: ICD-10-CM

## 2024-11-06 ENCOUNTER — PATIENT MESSAGE (OUTPATIENT)
Dept: OBSTETRICS AND GYNECOLOGY | Facility: CLINIC | Age: 54
End: 2024-11-06
Payer: COMMERCIAL

## 2024-11-06 DIAGNOSIS — N63.10 MASS OF RIGHT BREAST, UNSPECIFIED QUADRANT: Primary | ICD-10-CM

## 2024-11-06 RX ORDER — DEXLANSOPRAZOLE 60 MG/1
60 CAPSULE, DELAYED RELEASE ORAL DAILY
Qty: 90 CAPSULE | Refills: 3 | Status: SHIPPED | OUTPATIENT
Start: 2024-11-06

## 2024-11-07 NOTE — TELEPHONE ENCOUNTER
Renetta had additional views at [facility] on Tuesday. They were ordered by Kiko or the radiologist. Can you see if you can see these images? Thanks

## 2024-11-15 ENCOUNTER — PATIENT MESSAGE (OUTPATIENT)
Dept: PRIMARY CARE CLINIC | Facility: CLINIC | Age: 54
End: 2024-11-15
Payer: COMMERCIAL

## 2024-12-03 ENCOUNTER — PATIENT MESSAGE (OUTPATIENT)
Dept: OBSTETRICS AND GYNECOLOGY | Facility: CLINIC | Age: 54
End: 2024-12-03
Payer: COMMERCIAL

## 2025-01-10 DIAGNOSIS — I10 ESSENTIAL HYPERTENSION: ICD-10-CM

## 2025-01-10 DIAGNOSIS — F41.1 GENERALIZED ANXIETY DISORDER: ICD-10-CM

## 2025-01-10 DIAGNOSIS — M19.90 ARTHRITIS: ICD-10-CM

## 2025-01-10 DIAGNOSIS — E28.2 PCOS (POLYCYSTIC OVARIAN SYNDROME): ICD-10-CM

## 2025-01-10 DIAGNOSIS — E78.5 HYPERLIPIDEMIA, UNSPECIFIED HYPERLIPIDEMIA TYPE: ICD-10-CM

## 2025-01-14 RX ORDER — MELOXICAM 15 MG/1
15 TABLET ORAL DAILY
Qty: 90 TABLET | Refills: 3 | Status: SHIPPED | OUTPATIENT
Start: 2025-01-14

## 2025-01-14 RX ORDER — ROSUVASTATIN CALCIUM 5 MG/1
5 TABLET, COATED ORAL NIGHTLY
Qty: 90 TABLET | Refills: 3 | Status: SHIPPED | OUTPATIENT
Start: 2025-01-14

## 2025-01-14 RX ORDER — BUPROPION HYDROCHLORIDE 150 MG/1
150 TABLET ORAL DAILY
Qty: 90 TABLET | Refills: 3 | Status: SHIPPED | OUTPATIENT
Start: 2025-01-14

## 2025-01-14 RX ORDER — METFORMIN HYDROCHLORIDE 500 MG/1
500 TABLET, EXTENDED RELEASE ORAL 2 TIMES DAILY WITH MEALS
Qty: 180 TABLET | Refills: 3 | Status: SHIPPED | OUTPATIENT
Start: 2025-01-14

## 2025-01-14 RX ORDER — ENALAPRIL MALEATE 10 MG/1
10 TABLET ORAL DAILY
Qty: 90 TABLET | Refills: 3 | Status: SHIPPED | OUTPATIENT
Start: 2025-01-14

## 2025-01-23 ENCOUNTER — TELEPHONE (OUTPATIENT)
Dept: GASTROENTEROLOGY | Facility: CLINIC | Age: 55
End: 2025-01-23
Payer: COMMERCIAL

## 2025-01-23 NOTE — TELEPHONE ENCOUNTER
Rec'd incoming call from Piedad pre-admitting that pt stated she had her procedure with another provider in Dec. Updated Epic and notified VALENTINO. salma

## 2025-01-28 NOTE — PROGRESS NOTES
CC:  WELL WOMAN (menop/ hyst with BSO)  Patient Care Team:  Yudi Thakkar MD as PCP - General (Family Medicine)  Jermaine Cook MD (Cardiovascular Disease)  Michoacano Ruth MD (Gastroenterology)        HPI:  Patient is a 54 y.o. who presents for her well woman exam today.  History reviewed with patient.   Patient is without complaints or concerns today.     Her hyst was for bleeding and fibroids in   HRT:  never used systemic hrt  HX ABNL PAPS: none    REVIEW OF PRIOR DATA/ HEALTH MAINTENANCE:  LAST ANNUAL:   2024    LAST MMG- 2024-  [facility]    LAST COLONOSCOPY- - by Dr. Bailey  -q 5 yrs for polyps       Past Medical History:   Diagnosis Date    Acid reflux     BMI 36.02 2024    Hx of internal and external hemorrhoids     Hypercholesteremia     Hypertension     PCOS (polycystic ovarian syndrome)     Personal history of colonic polyps     Sleep apnea     cpap machine    Type 2 diabetes mellitus, without long-term current use of insulin 2020     SURGICAL HX:   has a past surgical history that includes Anterior cruciate ligament repair; Endometrial ablation; Tubal ligation; Knee arthroscopy; Total abdominal hysterectomy w/ bilateral salpingoophorectomy (2022); and Hysterectomy.    SOCIAL HX:    reports that she quit smoking about 10 years ago. Her smoking use included cigarettes. She started smoking about 15 years ago. She has a 1.4 pack-year smoking history. She has never used smokeless tobacco. She reports current alcohol use of about 2.0 standard drinks of alcohol per week. She reports that she does not use drugs.    Current Outpatient Medications   Medication Instructions    buPROPion (WELLBUTRIN XL) 150 mg, Oral, Daily    dexlansoprazole (DEXILANT) 60 mg, Oral, Daily    enalapril (VASOTEC) 10 mg, Oral, Daily    meloxicam (MOBIC) 15 mg, Oral, Daily    metFORMIN (GLUCOPHAGE-XR) 500 mg, Oral, 2 times daily with meals    rosuvastatin (CRESTOR) 5 mg, Oral,  "Nightly    tirzepatide 10 mg, Subcutaneous, Every 7 days     VITALS:  Blood pressure 117/80, height 5' 7" (1.702 m), weight 110.2 kg (243 lb), last menstrual period 09/05/2022.  Body mass index is 38.06 kg/m².     PHYSICAL EXAM-  APPEARANCE: Well appearing, in no acute distress.   NECK: Neck symmetric   CV/PULM: No resp distress, normal resp effort   PSYCH:  Normal mood and affect, cooperative   SKIN: No rashes, lesions, or abnormal bruising   ABD: Soft, without tenderness or masses.    BREAST: No skin changes or nipple dc.  No palpable masses or tenderness  PELVIC:  VULVA: Normal female genitalia. No lesions.   URETHRAL MEATUS: No masses, no significant prolapse.  BLADDER/ URETHRA: No masses or suprapubic tenderness   VAGINA/ CUFF: No lesions. +atrophic changes. No discharge   PELVIS: No masses, tenderness, or fullness on bimanual exam   ANUS/ PERINEUM: Normal tone.  No lesions.          *patient verbally consented for exam and female chaperone present for entire exam     ASSESSMENT and PLAN:  Encounter for well woman exam with routine gynecological exam  -     Liquid-based pap smear, screening    Breast cancer screening by mammogram  -     Mammo Digital Screening Bilat w/ Azeem; Future; Expected date: 02/11/2025    FOLLOWUP:  1 year for wwe or sooner prn    COUNSELING:  Patient was counseled today on recommendations for yearly pelvic exam, current Pap guidelines, self breast exams, annual screening mammograms, routine screening colonoscopy, and screening bone density. Reviewed calcium and vitamin D supplements and weight bearing exercise to minimize risks.  Encouraged patient to see her PCP for other health maintenance.       "

## 2025-01-29 ENCOUNTER — OFFICE VISIT (OUTPATIENT)
Dept: OBSTETRICS AND GYNECOLOGY | Facility: CLINIC | Age: 55
End: 2025-01-29
Payer: COMMERCIAL

## 2025-01-29 VITALS
WEIGHT: 243 LBS | DIASTOLIC BLOOD PRESSURE: 80 MMHG | HEIGHT: 67 IN | BODY MASS INDEX: 38.14 KG/M2 | SYSTOLIC BLOOD PRESSURE: 117 MMHG

## 2025-01-29 DIAGNOSIS — Z12.31 BREAST CANCER SCREENING BY MAMMOGRAM: ICD-10-CM

## 2025-01-29 DIAGNOSIS — Z01.419 ENCOUNTER FOR WELL WOMAN EXAM WITH ROUTINE GYNECOLOGICAL EXAM: Primary | ICD-10-CM

## 2025-01-29 PROCEDURE — 4010F ACE/ARB THERAPY RXD/TAKEN: CPT | Mod: CPTII,,, | Performed by: OBSTETRICS & GYNECOLOGY

## 2025-01-29 PROCEDURE — 3079F DIAST BP 80-89 MM HG: CPT | Mod: CPTII,,, | Performed by: OBSTETRICS & GYNECOLOGY

## 2025-01-29 PROCEDURE — 1159F MED LIST DOCD IN RCRD: CPT | Mod: CPTII,,, | Performed by: OBSTETRICS & GYNECOLOGY

## 2025-01-29 PROCEDURE — 3074F SYST BP LT 130 MM HG: CPT | Mod: CPTII,,, | Performed by: OBSTETRICS & GYNECOLOGY

## 2025-01-29 PROCEDURE — 3008F BODY MASS INDEX DOCD: CPT | Mod: CPTII,,, | Performed by: OBSTETRICS & GYNECOLOGY

## 2025-01-29 PROCEDURE — 99396 PREV VISIT EST AGE 40-64: CPT | Mod: S$PBB,,, | Performed by: OBSTETRICS & GYNECOLOGY

## 2025-02-06 ENCOUNTER — TELEPHONE (OUTPATIENT)
Dept: OBSTETRICS AND GYNECOLOGY | Facility: CLINIC | Age: 55
End: 2025-02-06
Payer: COMMERCIAL

## 2025-02-06 NOTE — TELEPHONE ENCOUNTER
Spoke with patient. Two pt identifiers confirmed. Notified patient of her pap and hpv results. Patient verbalized understanding. Assisted patient with scheduling a 6 months appointment

## 2025-02-06 NOTE — TELEPHONE ENCOUNTER
----- Message from Anisha Neff MD sent at 2/6/2025 10:03 AM CST -----  Please let pt know her pap result was lgsil but hpv was neg.  Therefore we will repeat her pap in 6 months. This can happen even vaginal dryness- we can always send her out some intrarosa or other options to treat that if she is feeling it.

## 2025-02-07 ENCOUNTER — PATIENT MESSAGE (OUTPATIENT)
Dept: OBSTETRICS AND GYNECOLOGY | Facility: CLINIC | Age: 55
End: 2025-02-07
Payer: COMMERCIAL

## 2025-02-07 DIAGNOSIS — N95.2 VAGINAL ATROPHY: Primary | ICD-10-CM

## 2025-02-07 RX ORDER — PRASTERONE 6.5 MG/1
6.5 INSERT VAGINAL NIGHTLY
Qty: 30 EACH | Refills: 11 | Status: SHIPPED | OUTPATIENT
Start: 2025-02-07

## 2025-02-07 RX ORDER — PRASTERONE 6.5 MG/1
6.5 INSERT VAGINAL
COMMUNITY
End: 2025-02-07 | Stop reason: SDUPTHER

## 2025-02-10 DIAGNOSIS — N95.2 ATROPHIC VAGINITIS: Primary | ICD-10-CM

## 2025-02-10 RX ORDER — ESTRADIOL 0.1 MG/G
1 CREAM VAGINAL
Qty: 42.5 G | Refills: 4 | Status: SHIPPED | OUTPATIENT
Start: 2025-02-10

## 2025-02-10 NOTE — TELEPHONE ENCOUNTER
The other options are estrace vag cream, vagifem tablets and imvexxy but no generic of imvexxy so cream or tablets may be more likely to be covered. Did her PA say what was covered?

## 2025-07-28 ENCOUNTER — PATIENT MESSAGE (OUTPATIENT)
Dept: OBSTETRICS AND GYNECOLOGY | Facility: CLINIC | Age: 55
End: 2025-07-28
Payer: COMMERCIAL

## 2025-07-29 ENCOUNTER — OFFICE VISIT (OUTPATIENT)
Dept: OBSTETRICS AND GYNECOLOGY | Facility: CLINIC | Age: 55
End: 2025-07-29
Payer: COMMERCIAL

## 2025-07-29 VITALS
BODY MASS INDEX: 35.43 KG/M2 | WEIGHT: 226.19 LBS | SYSTOLIC BLOOD PRESSURE: 131 MMHG | DIASTOLIC BLOOD PRESSURE: 82 MMHG

## 2025-07-29 DIAGNOSIS — Z12.31 BREAST CANCER SCREENING BY MAMMOGRAM: ICD-10-CM

## 2025-07-29 DIAGNOSIS — R87.622 LGSIL PAP SMEAR OF VAGINA: Primary | ICD-10-CM

## 2025-07-29 PROCEDURE — 4010F ACE/ARB THERAPY RXD/TAKEN: CPT | Mod: CPTII,,, | Performed by: OBSTETRICS & GYNECOLOGY

## 2025-07-29 PROCEDURE — 3008F BODY MASS INDEX DOCD: CPT | Mod: CPTII,,, | Performed by: OBSTETRICS & GYNECOLOGY

## 2025-07-29 PROCEDURE — 3079F DIAST BP 80-89 MM HG: CPT | Mod: CPTII,,, | Performed by: OBSTETRICS & GYNECOLOGY

## 2025-07-29 PROCEDURE — 99213 OFFICE O/P EST LOW 20 MIN: CPT | Mod: S$PBB,,, | Performed by: OBSTETRICS & GYNECOLOGY

## 2025-07-29 PROCEDURE — 1159F MED LIST DOCD IN RCRD: CPT | Mod: CPTII,,, | Performed by: OBSTETRICS & GYNECOLOGY

## 2025-07-29 PROCEDURE — 3075F SYST BP GE 130 - 139MM HG: CPT | Mod: CPTII,,, | Performed by: OBSTETRICS & GYNECOLOGY

## 2025-07-29 NOTE — PROGRESS NOTES
PROBLEM VISIT- menop/ hyst with BSO  Patient Care Team:  Yudi Thakkar MD as PCP - General (Family Medicine)  Jermaine Cook MD (Cardiovascular Disease)  Michoacano Ruth MD (Gastroenterology)    CC:  repeat pap smear  HPI:  Patient is a 54 y.o. who is here today for a repeat pap smear.  Pt without complaints or concerns. History reviewed and updated. Pts mmg stable now for 2 yrs so will get orders from  instead of [facility]    LAST ANNUAL :  2025     PAP HX:  2025     Gardasil:  no  Tobacco use: Tobacco Use History[1]     Past Medical History:   Diagnosis Date    Acid reflux     BMI 36.02 2024    Hx of internal and external hemorrhoids     Hypercholesteremia     Hypertension     PCOS (polycystic ovarian syndrome)     Personal history of colonic polyps     Sleep apnea     cpap machine    Type 2 diabetes mellitus, without long-term current use of insulin 2020     SURGICAL HX:   has a past surgical history that includes Anterior cruciate ligament repair; Endometrial ablation; Tubal ligation; Knee arthroscopy; Total abdominal hysterectomy w/ bilateral salpingoophorectomy (2022); and Hysterectomy.    Current Outpatient Medications   Medication Instructions    buPROPion (WELLBUTRIN XL) 150 mg, Oral, Daily    enalapril (VASOTEC) 10 mg, Oral, Daily    estradioL (ESTRACE) 1 g, Vaginal, Twice weekly    metFORMIN (GLUCOPHAGE-XR) 500 mg, Oral, 2 times daily with meals    MOUNJARO 12.5 mg/0.5 mL PnIj     rosuvastatin (CRESTOR) 5 mg, Oral, Nightly    topiramate (TOPAMAX) 50 mg, Nightly     Blood pressure 131/82, weight 102.6 kg (226 lb 3.2 oz), last menstrual period 2022.  Body mass index is 35.43 kg/m².    PHYSICAL EXAM-  APPEARANCE: Well appearing, in no acute distress.   NECK: Neck symmetric   CV/PULM: No resp distress, normal resp effort   PSYCH:  Normal mood and affect, cooperative   SKIN: No rashes, lesions, or abnormal bruising   ABD: Soft, without tenderness or  masses.    BREAST: No skin changes or nipple dc.  No palpable masses or tenderness  PELVIC:  VULVA: Normal female genitalia. No lesions.   URETHRAL MEATUS: No masses, no significant prolapse.  BLADDER/ URETHRA: No suprapubic tenderness   VAGINA/ CUFF: No lesions. +atrophic changes. No discharge   PELVIS: No masses, tenderness, or fullness on bimanual exam   ANUS/ PERINEUM: Normal tone.  No lesions.          *patient verbally consented to exam and female chaperone present for entire exam     ASSESSMENT/ PLAN:  LGSIL Pap smear of vagina  -     Liquid-based pap smear, screening    Breast cancer screening by mammogram  -     Mammo Digital Screening Bilat w/ Azeem ([facility]); Future; Expected date: 08/12/2025      FOLLOWUP:  Will notify patient with results          [1]   Social History  Tobacco Use   Smoking Status Former    Current packs/day: 0.00    Average packs/day: 0.3 packs/day for 5.5 years (1.4 ttl pk-yrs)    Types: Cigarettes    Start date: 3/1/2009    Quit date: 9/1/2014    Years since quitting: 10.9   Smokeless Tobacco Never

## 2025-08-07 ENCOUNTER — TELEPHONE (OUTPATIENT)
Dept: OBSTETRICS AND GYNECOLOGY | Facility: CLINIC | Age: 55
End: 2025-08-07
Payer: COMMERCIAL

## 2025-08-07 NOTE — TELEPHONE ENCOUNTER
----- Message from Anisha Neff MD sent at 8/7/2025  8:05 AM CDT -----  Please let pt know her pap is still showing some low grade changes but since her hpv has been consistently negative we can repeat in 1 year  ----- Message -----  From: Jean, [facility]  Sent: 8/6/2025   9:37 AM CDT  To: Anisha Neff MD